# Patient Record
Sex: MALE | Race: WHITE | NOT HISPANIC OR LATINO | Employment: FULL TIME | URBAN - METROPOLITAN AREA
[De-identification: names, ages, dates, MRNs, and addresses within clinical notes are randomized per-mention and may not be internally consistent; named-entity substitution may affect disease eponyms.]

---

## 2020-12-04 ENCOUNTER — LAB VISIT (OUTPATIENT)
Dept: PRIMARY CARE CLINIC | Facility: OTHER | Age: 55
End: 2020-12-04
Attending: INTERNAL MEDICINE
Payer: COMMERCIAL

## 2020-12-04 DIAGNOSIS — R51.9 HEAD ACHE: ICD-10-CM

## 2020-12-04 DIAGNOSIS — Z03.818 ENCOUNTER FOR OBSERVATION FOR SUSPECTED EXPOSURE TO OTHER BIOLOGICAL AGENTS RULED OUT: ICD-10-CM

## 2020-12-04 PROCEDURE — U0003 INFECTIOUS AGENT DETECTION BY NUCLEIC ACID (DNA OR RNA); SEVERE ACUTE RESPIRATORY SYNDROME CORONAVIRUS 2 (SARS-COV-2) (CORONAVIRUS DISEASE [COVID-19]), AMPLIFIED PROBE TECHNIQUE, MAKING USE OF HIGH THROUGHPUT TECHNOLOGIES AS DESCRIBED BY CMS-2020-01-R: HCPCS

## 2020-12-07 LAB — SARS-COV-2 RNA RESP QL NAA+PROBE: NOT DETECTED

## 2021-02-03 ENCOUNTER — TELEPHONE (OUTPATIENT)
Dept: CARDIOLOGY | Facility: CLINIC | Age: 56
End: 2021-02-03

## 2021-02-26 ENCOUNTER — OFFICE VISIT (OUTPATIENT)
Dept: CARDIOLOGY | Facility: CLINIC | Age: 56
End: 2021-02-26
Payer: COMMERCIAL

## 2021-02-26 VITALS
BODY MASS INDEX: 32.51 KG/M2 | HEART RATE: 92 BPM | SYSTOLIC BLOOD PRESSURE: 137 MMHG | HEIGHT: 70 IN | WEIGHT: 227.06 LBS | DIASTOLIC BLOOD PRESSURE: 83 MMHG

## 2021-02-26 DIAGNOSIS — R06.09 DOE (DYSPNEA ON EXERTION): ICD-10-CM

## 2021-02-26 DIAGNOSIS — M25.562 CHRONIC PAIN OF BOTH KNEES: ICD-10-CM

## 2021-02-26 DIAGNOSIS — I10 ESSENTIAL HYPERTENSION: ICD-10-CM

## 2021-02-26 DIAGNOSIS — G89.29 CHRONIC PAIN OF BOTH KNEES: ICD-10-CM

## 2021-02-26 DIAGNOSIS — E78.00 HYPERCHOLESTEROLEMIA: ICD-10-CM

## 2021-02-26 DIAGNOSIS — R07.2 PRECORDIAL PAIN: Primary | ICD-10-CM

## 2021-02-26 DIAGNOSIS — E66.9 OBESITY, CLASS I, BMI 30-34.9: ICD-10-CM

## 2021-02-26 DIAGNOSIS — M25.561 CHRONIC PAIN OF BOTH KNEES: ICD-10-CM

## 2021-02-26 DIAGNOSIS — R01.1 UNDIAGNOSED CARDIAC MURMURS: ICD-10-CM

## 2021-02-26 PROBLEM — E66.811 OBESITY, CLASS I, BMI 30-34.9: Status: ACTIVE | Noted: 2021-02-26

## 2021-02-26 PROCEDURE — 99999 PR PBB SHADOW E&M-EST. PATIENT-LVL III: ICD-10-PCS | Mod: PBBFAC,,, | Performed by: INTERNAL MEDICINE

## 2021-02-26 PROCEDURE — 3008F BODY MASS INDEX DOCD: CPT | Mod: CPTII,S$GLB,, | Performed by: INTERNAL MEDICINE

## 2021-02-26 PROCEDURE — 3008F PR BODY MASS INDEX (BMI) DOCUMENTED: ICD-10-PCS | Mod: CPTII,S$GLB,, | Performed by: INTERNAL MEDICINE

## 2021-02-26 PROCEDURE — 99204 OFFICE O/P NEW MOD 45 MIN: CPT | Mod: S$GLB,,, | Performed by: INTERNAL MEDICINE

## 2021-02-26 PROCEDURE — 99999 PR PBB SHADOW E&M-EST. PATIENT-LVL III: CPT | Mod: PBBFAC,,, | Performed by: INTERNAL MEDICINE

## 2021-02-26 PROCEDURE — 3079F DIAST BP 80-89 MM HG: CPT | Mod: CPTII,S$GLB,, | Performed by: INTERNAL MEDICINE

## 2021-02-26 PROCEDURE — 99204 PR OFFICE/OUTPT VISIT, NEW, LEVL IV, 45-59 MIN: ICD-10-PCS | Mod: S$GLB,,, | Performed by: INTERNAL MEDICINE

## 2021-02-26 PROCEDURE — 3075F PR MOST RECENT SYSTOLIC BLOOD PRESS GE 130-139MM HG: ICD-10-PCS | Mod: CPTII,S$GLB,, | Performed by: INTERNAL MEDICINE

## 2021-02-26 PROCEDURE — 3075F SYST BP GE 130 - 139MM HG: CPT | Mod: CPTII,S$GLB,, | Performed by: INTERNAL MEDICINE

## 2021-02-26 PROCEDURE — 1126F AMNT PAIN NOTED NONE PRSNT: CPT | Mod: S$GLB,,, | Performed by: INTERNAL MEDICINE

## 2021-02-26 PROCEDURE — 3079F PR MOST RECENT DIASTOLIC BLOOD PRESSURE 80-89 MM HG: ICD-10-PCS | Mod: CPTII,S$GLB,, | Performed by: INTERNAL MEDICINE

## 2021-02-26 PROCEDURE — 1126F PR PAIN SEVERITY QUANTIFIED, NO PAIN PRESENT: ICD-10-PCS | Mod: S$GLB,,, | Performed by: INTERNAL MEDICINE

## 2021-02-26 RX ORDER — ASPIRIN 325 MG
325 TABLET, DELAYED RELEASE (ENTERIC COATED) ORAL DAILY
COMMUNITY
End: 2021-12-02

## 2021-02-26 RX ORDER — NITROGLYCERIN 0.4 MG/1
0.4 TABLET SUBLINGUAL EVERY 5 MIN PRN
Qty: 25 TABLET | Refills: 0 | Status: SHIPPED | OUTPATIENT
Start: 2021-02-26 | End: 2021-05-10 | Stop reason: SDUPTHER

## 2021-02-26 RX ORDER — NAPROXEN SODIUM 220 MG
220 TABLET ORAL
COMMUNITY
End: 2021-02-26

## 2021-03-15 ENCOUNTER — PATIENT MESSAGE (OUTPATIENT)
Dept: CARDIOLOGY | Facility: CLINIC | Age: 56
End: 2021-03-15

## 2021-03-16 ENCOUNTER — TELEPHONE (OUTPATIENT)
Dept: CARDIOLOGY | Facility: CLINIC | Age: 56
End: 2021-03-16

## 2021-03-16 DIAGNOSIS — R07.2 PRECORDIAL PAIN: Primary | ICD-10-CM

## 2021-03-18 ENCOUNTER — CLINICAL SUPPORT (OUTPATIENT)
Dept: CARDIOLOGY | Facility: CLINIC | Age: 56
End: 2021-03-18
Attending: INTERNAL MEDICINE
Payer: COMMERCIAL

## 2021-03-18 DIAGNOSIS — R07.2 PRECORDIAL PAIN: ICD-10-CM

## 2021-03-18 PROCEDURE — 93224 XTRNL ECG REC UP TO 48 HRS: CPT | Mod: S$GLB,,, | Performed by: INTERNAL MEDICINE

## 2021-03-18 PROCEDURE — 93224 HOLTER MONITOR - 24 HOUR (CUPID ONLY): ICD-10-PCS | Mod: S$GLB,,, | Performed by: INTERNAL MEDICINE

## 2021-03-22 LAB
OHS CV EVENT MONITOR DAY: 0
OHS CV HOLTER LENGTH DECIMAL HOURS: 24
OHS CV HOLTER LENGTH HOURS: 24
OHS CV HOLTER LENGTH MINUTES: 0

## 2021-03-24 ENCOUNTER — HOSPITAL ENCOUNTER (OUTPATIENT)
Dept: RADIOLOGY | Facility: HOSPITAL | Age: 56
Discharge: HOME | End: 2021-03-24
Attending: INTERNAL MEDICINE
Payer: COMMERCIAL

## 2021-03-24 ENCOUNTER — CLINICAL SUPPORT (OUTPATIENT)
Dept: CARDIOLOGY | Facility: CLINIC | Age: 56
End: 2021-03-24
Attending: INTERNAL MEDICINE
Payer: COMMERCIAL

## 2021-03-24 VITALS — WEIGHT: 227 LBS | BODY MASS INDEX: 32.5 KG/M2 | HEIGHT: 70 IN

## 2021-03-24 DIAGNOSIS — M25.561 CHRONIC PAIN OF BOTH KNEES: ICD-10-CM

## 2021-03-24 DIAGNOSIS — G89.29 CHRONIC PAIN OF BOTH KNEES: ICD-10-CM

## 2021-03-24 DIAGNOSIS — R06.09 DOE (DYSPNEA ON EXERTION): ICD-10-CM

## 2021-03-24 DIAGNOSIS — R07.2 PRECORDIAL PAIN: ICD-10-CM

## 2021-03-24 DIAGNOSIS — M25.562 CHRONIC PAIN OF BOTH KNEES: ICD-10-CM

## 2021-03-24 LAB
ASCENDING AORTA: 3.77 CM
AV INDEX (PROSTH): 0.54
AV MEAN GRADIENT: 4 MMHG
AV PEAK GRADIENT: 7 MMHG
AV VALVE AREA: 2.19 CM2
AV VELOCITY RATIO: 0.78
BSA FOR ECHO PROCEDURE: 2.25 M2
CV ECHO LV RWT: 0.38 CM
CV STRESS BASE HR: 72 BPM
DIASTOLIC BLOOD PRESSURE: 75 MMHG
DOP CALC AO PEAK VEL: 1.35 M/S
DOP CALC AO VTI: 30.31 CM
DOP CALC LVOT AREA: 4 CM2
DOP CALC LVOT DIAMETER: 2.27 CM
DOP CALC LVOT PEAK VEL: 1.05 M/S
DOP CALC LVOT STROKE VOLUME: 66.3 CM3
DOP CALCLVOT PEAK VEL VTI: 16.39 CM
E WAVE DECELERATION TIME: 210.27 MSEC
E/A RATIO: 1.21
E/E' RATIO: 10.67 M/S
ECHO LV POSTERIOR WALL: 0.96 CM (ref 0.6–1.1)
FRACTIONAL SHORTENING: 45 % (ref 28–44)
INTERVENTRICULAR SEPTUM: 0.99 CM (ref 0.6–1.1)
IVRT: 108.47 MSEC
LA MAJOR: 4.46 CM
LA MINOR: 4.6 CM
LA WIDTH: 3.44 CM
LEFT ATRIUM SIZE: 3.87 CM
LEFT ATRIUM VOLUME INDEX: 23.3 ML/M2
LEFT ATRIUM VOLUME: 51.25 CM3
LEFT INTERNAL DIMENSION IN SYSTOLE: 2.82 CM (ref 2.1–4)
LEFT VENTRICLE DIASTOLIC VOLUME INDEX: 56.15 ML/M2
LEFT VENTRICLE DIASTOLIC VOLUME: 123.53 ML
LEFT VENTRICLE MASS INDEX: 83 G/M2
LEFT VENTRICLE SYSTOLIC VOLUME INDEX: 12.5 ML/M2
LEFT VENTRICLE SYSTOLIC VOLUME: 27.6 ML
LEFT VENTRICULAR INTERNAL DIMENSION IN DIASTOLE: 5.1 CM (ref 3.5–6)
LEFT VENTRICULAR MASS: 181.77 G
LV LATERAL E/E' RATIO: 11.43 M/S
LV SEPTAL E/E' RATIO: 10 M/S
MV A" WAVE DURATION": 7.99 MSEC
MV PEAK A VEL: 0.66 M/S
MV PEAK E VEL: 0.8 M/S
NUC REST EJECTION FRACTION: 65
NUC STRESS EJECTION FRACTION: 70 %
OHS CV CPX 1 MINUTE RECOVERY HEART RATE: 104 BPM
OHS CV CPX 85 PERCENT MAX PREDICTED HEART RATE MALE: 138
OHS CV CPX MAX PREDICTED HEART RATE: 162
OHS CV CPX PATIENT IS FEMALE: 0
OHS CV CPX PATIENT IS MALE: 1
OHS CV CPX PEAK DIASTOLIC BLOOD PRESSURE: 63 MMHG
OHS CV CPX PEAK HEAR RATE: 104 BPM
OHS CV CPX PEAK RATE PRESSURE PRODUCT: NORMAL
OHS CV CPX PEAK SYSTOLIC BLOOD PRESSURE: 130 MMHG
OHS CV CPX PERCENT MAX PREDICTED HEART RATE ACHIEVED: 64
OHS CV CPX RATE PRESSURE PRODUCT PRESENTING: 8712
PISA MRMAX VEL: 0.05 M/S
PISA TR MAX VEL: 2.71 M/S
PULM VEIN S/D RATIO: 1.25
PV PEAK D VEL: 0.52 M/S
PV PEAK S VEL: 0.65 M/S
RA MAJOR: 5.41 CM
RA PRESSURE: 3 MMHG
RA WIDTH: 5.1 CM
RIGHT VENTRICULAR END-DIASTOLIC DIMENSION: 3.92 CM
RV TISSUE DOPPLER FREE WALL SYSTOLIC VELOCITY 1 (APICAL 4 CHAMBER VIEW): 16.89 CM/S
SINUS: 3.23 CM
STJ: 3.27 CM
SYSTOLIC BLOOD PRESSURE: 121 MMHG
TDI LATERAL: 0.07 M/S
TDI SEPTAL: 0.08 M/S
TDI: 0.08 M/S
TR MAX PG: 29 MMHG
TRICUSPID ANNULAR PLANE SYSTOLIC EXCURSION: 2.92 CM
TV REST PULMONARY ARTERY PRESSURE: 32 MMHG

## 2021-03-24 PROCEDURE — 93016 STRESS TEST WITH MYOCARDIAL PERFUSION (CUPID ONLY): ICD-10-PCS | Mod: ,,, | Performed by: INTERNAL MEDICINE

## 2021-03-24 PROCEDURE — 93016 CV STRESS TEST SUPVJ ONLY: CPT | Mod: ,,, | Performed by: INTERNAL MEDICINE

## 2021-03-24 PROCEDURE — 99999 PR PBB SHADOW E&M-EST. PATIENT-LVL I: CPT | Mod: PBBFAC,,,

## 2021-03-24 PROCEDURE — 99999 PR PBB SHADOW E&M-EST. PATIENT-LVL I: ICD-10-PCS | Mod: PBBFAC,,,

## 2021-03-24 PROCEDURE — 93018 PR CARDIAC STRESS TST,INTERP/REPT ONLY: ICD-10-PCS | Mod: ,,, | Performed by: INTERNAL MEDICINE

## 2021-03-24 PROCEDURE — 93017 CV STRESS TEST TRACING ONLY: CPT | Mod: PO

## 2021-03-24 PROCEDURE — 78452 HT MUSCLE IMAGE SPECT MULT: CPT | Mod: 26,,, | Performed by: INTERNAL MEDICINE

## 2021-03-24 PROCEDURE — 93018 CV STRESS TEST I&R ONLY: CPT | Mod: ,,, | Performed by: INTERNAL MEDICINE

## 2021-03-24 PROCEDURE — 78452 STRESS TEST WITH MYOCARDIAL PERFUSION (CUPID ONLY): ICD-10-PCS | Mod: 26,,, | Performed by: INTERNAL MEDICINE

## 2021-03-24 RX ORDER — REGADENOSON 0.08 MG/ML
0.4 INJECTION, SOLUTION INTRAVENOUS ONCE
Status: COMPLETED | OUTPATIENT
Start: 2021-03-24 | End: 2021-03-24

## 2021-03-24 RX ADMIN — REGADENOSON 0.4 MG: 0.08 INJECTION, SOLUTION INTRAVENOUS at 09:03

## 2021-04-12 ENCOUNTER — OFFICE VISIT (OUTPATIENT)
Dept: CARDIOLOGY | Facility: CLINIC | Age: 56
End: 2021-04-12
Payer: COMMERCIAL

## 2021-04-12 VITALS
DIASTOLIC BLOOD PRESSURE: 81 MMHG | SYSTOLIC BLOOD PRESSURE: 158 MMHG | HEIGHT: 70 IN | WEIGHT: 228.81 LBS | HEART RATE: 91 BPM | BODY MASS INDEX: 32.76 KG/M2

## 2021-04-12 DIAGNOSIS — I34.0 NONRHEUMATIC MITRAL VALVE REGURGITATION: Primary | Chronic | ICD-10-CM

## 2021-04-12 DIAGNOSIS — E66.9 OBESITY, CLASS I, BMI 30-34.9: ICD-10-CM

## 2021-04-12 DIAGNOSIS — I49.49 PREMATURE BEATS: ICD-10-CM

## 2021-04-12 DIAGNOSIS — E78.00 HYPERCHOLESTEROLEMIA: Chronic | ICD-10-CM

## 2021-04-12 DIAGNOSIS — I77.810 ASCENDING AORTA DILATATION: ICD-10-CM

## 2021-04-12 DIAGNOSIS — I10 ESSENTIAL HYPERTENSION: Chronic | ICD-10-CM

## 2021-04-12 DIAGNOSIS — E78.5 DYSLIPIDEMIA: ICD-10-CM

## 2021-04-12 PROBLEM — R01.1 UNDIAGNOSED CARDIAC MURMURS: Status: RESOLVED | Noted: 2021-02-26 | Resolved: 2021-04-12

## 2021-04-12 PROCEDURE — 3008F PR BODY MASS INDEX (BMI) DOCUMENTED: ICD-10-PCS | Mod: CPTII,S$GLB,, | Performed by: INTERNAL MEDICINE

## 2021-04-12 PROCEDURE — 99214 OFFICE O/P EST MOD 30 MIN: CPT | Mod: S$GLB,,, | Performed by: INTERNAL MEDICINE

## 2021-04-12 PROCEDURE — 1126F AMNT PAIN NOTED NONE PRSNT: CPT | Mod: S$GLB,,, | Performed by: INTERNAL MEDICINE

## 2021-04-12 PROCEDURE — 99999 PR PBB SHADOW E&M-EST. PATIENT-LVL III: CPT | Mod: PBBFAC,,, | Performed by: INTERNAL MEDICINE

## 2021-04-12 PROCEDURE — 1126F PR PAIN SEVERITY QUANTIFIED, NO PAIN PRESENT: ICD-10-PCS | Mod: S$GLB,,, | Performed by: INTERNAL MEDICINE

## 2021-04-12 PROCEDURE — 99999 PR PBB SHADOW E&M-EST. PATIENT-LVL III: ICD-10-PCS | Mod: PBBFAC,,, | Performed by: INTERNAL MEDICINE

## 2021-04-12 PROCEDURE — 3079F PR MOST RECENT DIASTOLIC BLOOD PRESSURE 80-89 MM HG: ICD-10-PCS | Mod: CPTII,S$GLB,, | Performed by: INTERNAL MEDICINE

## 2021-04-12 PROCEDURE — 3077F SYST BP >= 140 MM HG: CPT | Mod: CPTII,S$GLB,, | Performed by: INTERNAL MEDICINE

## 2021-04-12 PROCEDURE — 99214 PR OFFICE/OUTPT VISIT, EST, LEVL IV, 30-39 MIN: ICD-10-PCS | Mod: S$GLB,,, | Performed by: INTERNAL MEDICINE

## 2021-04-12 PROCEDURE — 3077F PR MOST RECENT SYSTOLIC BLOOD PRESSURE >= 140 MM HG: ICD-10-PCS | Mod: CPTII,S$GLB,, | Performed by: INTERNAL MEDICINE

## 2021-04-12 PROCEDURE — 3079F DIAST BP 80-89 MM HG: CPT | Mod: CPTII,S$GLB,, | Performed by: INTERNAL MEDICINE

## 2021-04-12 PROCEDURE — 3008F BODY MASS INDEX DOCD: CPT | Mod: CPTII,S$GLB,, | Performed by: INTERNAL MEDICINE

## 2021-04-12 RX ORDER — AMLODIPINE BESYLATE 5 MG/1
5 TABLET ORAL DAILY
Qty: 90 TABLET | Refills: 1 | Status: SHIPPED | OUTPATIENT
Start: 2021-04-12 | End: 2021-10-14 | Stop reason: SDUPTHER

## 2021-04-12 RX ORDER — CARVEDILOL 3.12 MG/1
3.12 TABLET ORAL 2 TIMES DAILY WITH MEALS
Qty: 180 TABLET | Refills: 1 | Status: SHIPPED | OUTPATIENT
Start: 2021-04-12 | End: 2021-10-10

## 2021-04-12 RX ORDER — ATORVASTATIN CALCIUM 20 MG/1
20 TABLET, FILM COATED ORAL DAILY
Qty: 90 TABLET | Refills: 1 | Status: SHIPPED | OUTPATIENT
Start: 2021-04-12 | End: 2021-07-19 | Stop reason: SDUPTHER

## 2021-05-10 ENCOUNTER — PATIENT MESSAGE (OUTPATIENT)
Dept: CARDIOLOGY | Facility: CLINIC | Age: 56
End: 2021-05-10

## 2021-05-10 DIAGNOSIS — I49.49 PREMATURE BEATS: Primary | ICD-10-CM

## 2021-05-10 RX ORDER — NITROGLYCERIN 0.4 MG/1
0.4 TABLET SUBLINGUAL EVERY 5 MIN PRN
Qty: 25 TABLET | Refills: 0 | Status: SHIPPED | OUTPATIENT
Start: 2021-05-10 | End: 2021-12-02

## 2021-10-14 ENCOUNTER — OFFICE VISIT (OUTPATIENT)
Dept: CARDIOLOGY | Facility: CLINIC | Age: 56
End: 2021-10-14
Payer: COMMERCIAL

## 2021-10-14 VITALS
DIASTOLIC BLOOD PRESSURE: 83 MMHG | HEIGHT: 70 IN | SYSTOLIC BLOOD PRESSURE: 131 MMHG | WEIGHT: 217.81 LBS | BODY MASS INDEX: 31.18 KG/M2 | HEART RATE: 70 BPM

## 2021-10-14 DIAGNOSIS — I10 ESSENTIAL HYPERTENSION: Chronic | ICD-10-CM

## 2021-10-14 DIAGNOSIS — I34.0 NONRHEUMATIC MITRAL VALVE REGURGITATION: Primary | Chronic | ICD-10-CM

## 2021-10-14 DIAGNOSIS — I49.49 PREMATURE BEATS: Chronic | ICD-10-CM

## 2021-10-14 DIAGNOSIS — E66.9 OBESITY, CLASS I, BMI 30-34.9: Chronic | ICD-10-CM

## 2021-10-14 DIAGNOSIS — E78.5 DYSLIPIDEMIA: Chronic | ICD-10-CM

## 2021-10-14 DIAGNOSIS — I77.810 ASCENDING AORTA DILATATION: Chronic | ICD-10-CM

## 2021-10-14 PROCEDURE — 3075F SYST BP GE 130 - 139MM HG: CPT | Mod: CPTII,S$GLB,, | Performed by: INTERNAL MEDICINE

## 2021-10-14 PROCEDURE — 1159F PR MEDICATION LIST DOCUMENTED IN MEDICAL RECORD: ICD-10-PCS | Mod: CPTII,S$GLB,, | Performed by: INTERNAL MEDICINE

## 2021-10-14 PROCEDURE — 3008F BODY MASS INDEX DOCD: CPT | Mod: CPTII,S$GLB,, | Performed by: INTERNAL MEDICINE

## 2021-10-14 PROCEDURE — 1159F MED LIST DOCD IN RCRD: CPT | Mod: CPTII,S$GLB,, | Performed by: INTERNAL MEDICINE

## 2021-10-14 PROCEDURE — 3008F PR BODY MASS INDEX (BMI) DOCUMENTED: ICD-10-PCS | Mod: CPTII,S$GLB,, | Performed by: INTERNAL MEDICINE

## 2021-10-14 PROCEDURE — 99999 PR PBB SHADOW E&M-EST. PATIENT-LVL III: ICD-10-PCS | Mod: PBBFAC,,, | Performed by: INTERNAL MEDICINE

## 2021-10-14 PROCEDURE — 99999 PR PBB SHADOW E&M-EST. PATIENT-LVL III: CPT | Mod: PBBFAC,,, | Performed by: INTERNAL MEDICINE

## 2021-10-14 PROCEDURE — 99214 OFFICE O/P EST MOD 30 MIN: CPT | Mod: S$GLB,,, | Performed by: INTERNAL MEDICINE

## 2021-10-14 PROCEDURE — 99214 PR OFFICE/OUTPT VISIT, EST, LEVL IV, 30-39 MIN: ICD-10-PCS | Mod: S$GLB,,, | Performed by: INTERNAL MEDICINE

## 2021-10-14 PROCEDURE — 3079F PR MOST RECENT DIASTOLIC BLOOD PRESSURE 80-89 MM HG: ICD-10-PCS | Mod: CPTII,S$GLB,, | Performed by: INTERNAL MEDICINE

## 2021-10-14 PROCEDURE — 3075F PR MOST RECENT SYSTOLIC BLOOD PRESS GE 130-139MM HG: ICD-10-PCS | Mod: CPTII,S$GLB,, | Performed by: INTERNAL MEDICINE

## 2021-10-14 PROCEDURE — 3079F DIAST BP 80-89 MM HG: CPT | Mod: CPTII,S$GLB,, | Performed by: INTERNAL MEDICINE

## 2021-10-14 RX ORDER — ATORVASTATIN CALCIUM 20 MG/1
20 TABLET, FILM COATED ORAL DAILY
Qty: 90 TABLET | Refills: 1 | Status: SHIPPED | OUTPATIENT
Start: 2021-10-14 | End: 2022-07-29

## 2021-10-14 RX ORDER — AMLODIPINE BESYLATE 5 MG/1
5 TABLET ORAL DAILY
Qty: 90 TABLET | Refills: 1 | Status: SHIPPED | OUTPATIENT
Start: 2021-10-14 | End: 2022-07-27

## 2021-10-14 RX ORDER — CARVEDILOL 3.12 MG/1
3.12 TABLET ORAL 2 TIMES DAILY WITH MEALS
Qty: 180 TABLET | Refills: 1 | Status: SHIPPED | OUTPATIENT
Start: 2021-10-14 | End: 2022-07-27

## 2022-09-15 ENCOUNTER — TELEPHONE (OUTPATIENT)
Dept: PAIN MEDICINE | Facility: CLINIC | Age: 57
End: 2022-09-15
Payer: OTHER GOVERNMENT

## 2022-09-15 ENCOUNTER — PATIENT MESSAGE (OUTPATIENT)
Dept: PAIN MEDICINE | Facility: CLINIC | Age: 57
End: 2022-09-15
Payer: OTHER GOVERNMENT

## 2022-09-15 NOTE — TELEPHONE ENCOUNTER
Return call received from Pt inquiring about restrictions. Pt is informed that he is only scheduled for an office visit with Dr. Pritchard. Pt is provided fax number [facility]. Pt is aware and will take care of it. [phone].

## 2022-09-15 NOTE — TELEPHONE ENCOUNTER
Call placed and my chart message sent in regards to Pt's activity restrictions after his shot. No appt noted. No naswer. V/m left.

## 2022-09-15 NOTE — TELEPHONE ENCOUNTER
----- Message from Harini Arana sent at 9/14/2022  3:49 PM CDT -----  Contact: pt  Type: Needs Medical Advice    Who Called: pt  Best Call Back Number: [phone]    Inquiry/Question: pt wants to know if there are any restrictions after this shot, can he drive himself.          Thank you~

## 2022-09-20 ENCOUNTER — OFFICE VISIT (OUTPATIENT)
Dept: PAIN MEDICINE | Facility: CLINIC | Age: 57
End: 2022-09-20
Payer: OTHER GOVERNMENT

## 2022-09-20 VITALS
BODY MASS INDEX: 31.22 KG/M2 | HEIGHT: 70 IN | WEIGHT: 218.06 LBS | HEART RATE: 79 BPM | DIASTOLIC BLOOD PRESSURE: 74 MMHG | OXYGEN SATURATION: 97 % | SYSTOLIC BLOOD PRESSURE: 122 MMHG

## 2022-09-20 DIAGNOSIS — M54.16 LUMBAR RADICULOPATHY: Primary | ICD-10-CM

## 2022-09-20 DIAGNOSIS — G89.29 CHRONIC RIGHT-SIDED LOW BACK PAIN WITH RIGHT-SIDED SCIATICA: ICD-10-CM

## 2022-09-20 DIAGNOSIS — M54.41 CHRONIC RIGHT-SIDED LOW BACK PAIN WITH RIGHT-SIDED SCIATICA: ICD-10-CM

## 2022-09-20 PROCEDURE — 99215 OFFICE O/P EST HI 40 MIN: CPT | Mod: PBBFAC,PN | Performed by: ANESTHESIOLOGY

## 2022-09-20 PROCEDURE — 99204 OFFICE O/P NEW MOD 45 MIN: CPT | Mod: S$PBB,,, | Performed by: ANESTHESIOLOGY

## 2022-09-20 PROCEDURE — 99999 PR PBB SHADOW E&M-EST. PATIENT-LVL V: ICD-10-PCS | Mod: PBBFAC,,, | Performed by: ANESTHESIOLOGY

## 2022-09-20 PROCEDURE — 99999 PR PBB SHADOW E&M-EST. PATIENT-LVL V: CPT | Mod: PBBFAC,,, | Performed by: ANESTHESIOLOGY

## 2022-09-20 PROCEDURE — 99204 PR OFFICE/OUTPT VISIT, NEW, LEVL IV, 45-59 MIN: ICD-10-PCS | Mod: S$PBB,,, | Performed by: ANESTHESIOLOGY

## 2022-09-20 RX ORDER — SODIUM CHLORIDE, SODIUM LACTATE, POTASSIUM CHLORIDE, CALCIUM CHLORIDE 600; 310; 30; 20 MG/100ML; MG/100ML; MG/100ML; MG/100ML
INJECTION, SOLUTION INTRAVENOUS CONTINUOUS
Status: CANCELLED | OUTPATIENT
Start: 2022-09-20

## 2022-09-20 NOTE — PROGRESS NOTES
[facility] New Patient Evaluation      CC:   Chief Complaint   Patient presents with    Back Pain      No flowsheet data found.    HPI:   Bob Farmer is a 57 y.o. male who presents with back pain.  He reports that his back pain started on July 22, 2022.  He denies any specific trauma or inciting event.  He reports he woke up 1 morning with severe lower back pain radiating down the right leg.  Today he reports his pain can range from 03/10/2010, intermittent, aching, burning, tingling, sharp, shooting radiating pain down the right leg to the right foot.  He reports numbness and weakness in the right leg.  His pain is worse with lying, bending and relieved with sitting.    History:    Current Outpatient Medications:     amLODIPine (NORVASC) 5 MG tablet, Take 1 tablet by mouth once daily, Disp: 90 tablet, Rfl: 0    atorvastatin (LIPITOR) 20 MG tablet, Take 1 tablet by mouth once daily, Disp: 90 tablet, Rfl: 0    carvediloL (COREG) 3.125 MG tablet, TAKE 1 TABLET BY MOUTH TWICE DAILY WITH MEALS, Disp: 180 tablet, Rfl: 0    FLUoxetine 40 MG capsule, Take 1 capsule by mouth once daily, Disp: 90 capsule, Rfl: 0    Past Medical History:   Diagnosis Date    Allergy     Hyperlipemia     Hypertension        Past Surgical History:   Procedure Laterality Date    COLONOSCOPY  02/22/2019    Dr Lofton, received report     HERNIA REPAIR         Family History   Problem Relation Age of Onset    Hypertension Mother     Diabetes Father        Social History     Socioeconomic History    Marital status:    Tobacco Use    Smoking status: Never    Smokeless tobacco: Never   Substance and Sexual Activity    Alcohol use: Yes     Alcohol/week: 0.0 standard drinks     Comment: occasional    Drug use: No    Sexual activity: Yes     Partners: Female     Social Determinants of Health     Financial Resource Strain: Low Risk     Difficulty of Paying Living Expenses: Not hard at all   Food Insecurity: No Food Insecurity     "Worried About Running Out of Food in the Last Year: Never true    Ran Out of Food in the Last Year: Never true   Transportation Needs: No Transportation Needs    Lack of Transportation (Medical): No    Lack of Transportation (Non-Medical): No   Physical Activity: Inactive    Days of Exercise per Week: 0 days    Minutes of Exercise per Session: 0 min   Stress: No Stress Concern Present    Feeling of Stress : Only a little   Social Connections: Unknown    Frequency of Communication with Friends and Family: Once a week    Frequency of Social Gatherings with Friends and Family: Never    Active Member of Clubs or Organizations: No    Attends Club or Organization Meetings: Never    Marital Status:    Housing Stability: Low Risk     Unable to Pay for Housing in the Last Year: No    Number of Places Lived in the Last Year: 1    Unstable Housing in the Last Year: No       Review of patient's allergies indicates:  No Known Allergies    Review of Systems:  General ROS: negative for - fever  Psychological ROS: negative for - hostility  Hematological and Lymphatic ROS: negative for - bleeding problems  Endocrine ROS: negative for - unexpected weight changes  Respiratory ROS: no cough, shortness of breath, or wheezing  Cardiovascular ROS: no chest pain or dyspnea on exertion  Gastrointestinal ROS: no abdominal pain, change in bowel habits, or black or bloody stools  Musculoskeletal ROS: positive for - muscular weakness  Neurological ROS: positive for - numbness/tingling  Dermatological ROS: negative for rash    Physical Exam:  Vitals:    09/20/22 0903   BP: 122/74   Pulse: 79   SpO2: 97%   Weight: 98.9 kg (218 lb 0.6 oz)   Height: 5' 10" (1.778 m)   PainSc:   6   PainLoc: Back     Body mass index is 31.28 kg/m².      Gen: NAD  Gait: gait intact  Psych:  Mood appropriate for given condition  HEENT: eyes anicteric   GI: Abd soft  CV: RRR  Lungs: breathing unlabored   ROM: limited AROM of the L spine in all planes, full ROM " at ankles, knees and hips  Sensation: intact to light touch in all dermatomes tested from L2-S1 bilaterally, except decreased over the right L5  Reflexes: 2+ b/l patella and 0 b/l achilles  Palpation: Diffusely tender over lumbar paraspinals  -TTP over the b/l greater trochanters and bilateral SI joint  Tone: normal in the b/l knees and hips   Skin: intact  Extremities: No edema in b/l ankles or hands  Provacative tests:        Right Left   L2/3 Iliacus Hip flexion  5  5   L3/4 Qudratus Femoris Knee Extension  5  5   L4/5 Tib Anterior Ankle Dorsiflexion   5  5   L5/S1 Extensor Hallicus Longus Great toe extension  2+ to 3-  5                 S1/S2 Gastroc/Soleus Plantar Flexion  5  5       Imaging:  he does not have his lumbar MRI with him however he [facility] and it appears that he has an L4-5 right paracentral disc extrusion causing compression of the right L5 nerve root    Labs:  BMP  Lab Results   Component Value Date     02/02/2021    K 4.8 02/02/2021     02/02/2021    CO2 26 02/02/2021    BUN 21 02/02/2021    CREATININE 0.88 02/02/2021    CALCIUM 9.7 02/02/2021    ANIONGAP 12 02/02/2021    ESTGFRAFRICA >60 02/02/2021    EGFRNONAA >60 02/02/2021     Lab Results   Component Value Date    ALT 23 02/02/2021    AST 50 02/02/2021    ALKPHOS 119 02/02/2021    BILITOT 1.6 (H) 02/02/2021       Assessment:   Problem List Items Addressed This Visit    None  Visit Diagnoses       Lumbar radiculopathy    -  Primary    Relevant Orders    Ambulatory referral/consult to Physical/Occupational Therapy    Case Request Operating Room: Injection-steroid-epidural-lumbar L5/S1 to right (Completed)    Chronic right-sided low back pain with right-sided sciatica                57 y.o. year old male with PMH HTN who presents with back pain.  He reports that his back pain started on July 22, 2022.  He denies any specific trauma or inciting event.  He reports he woke up 1 morning with severe lower back  pain radiating down the right leg.  Today he reports his pain can range from 03/10/2010, intermittent, aching, burning, tingling, sharp, shooting radiating pain down the right leg to the right foot.  He reports numbness and weakness in the right leg.  His pain is worse with lying, bending and relieved with sitting.    - on exam he has 2+to 3-/5 right EHL.  He has decreased sensation to light touch in the right L5 distribution  - he does not have his lumbar MRI with him however he [facility] and [facility] appears that he has an L4-5 right paracentral disc extrusion causing compression of the right L5 nerve root  - he reports [facility] who [facility] however he did not wish to proceed with surgical interventions at this time.  I discussed that I also recommended a repeat surgical evaluation given his persistent weakness however he is not interested in seeing a surgeon again right now and would really like to maximize all conservative treatment options prior to considering surgery  - his pain is limiting his mobility and interfering with his ADLs  - we will schedule for a lumbar FAIZAN followed by formal physical therapy.  I have placed a referral for the formal physical therapy.  - schedule for L5/S1 FAIZAN to the right. The risks and benefits of this intervention, and alternative therapies were discussed with the patient.  The discussion of risks included infection, bleeding, need for additional procedures or surgery, nerve damage.  Questions regarding the procedure, risks, expected outcome, and possible side effects were solicited and answered to the patient's satisfaction.  Oneil Farmer wishes to proceed with the injection or procedure.  Written consent was obtained.  - follow-up 1 week post injection.      : Not applicable    Benny Pritchard M.D.  Interventional Pain Medicine / Anesthesiology    This note was completed with dictation software and grammatical errors may  exist.

## 2022-09-22 ENCOUNTER — CLINICAL SUPPORT (OUTPATIENT)
Dept: REHABILITATION | Facility: HOSPITAL | Age: 57
End: 2022-09-22
Attending: ANESTHESIOLOGY
Payer: OTHER GOVERNMENT

## 2022-09-22 DIAGNOSIS — M54.16 LUMBAR RADICULOPATHY: ICD-10-CM

## 2022-09-22 DIAGNOSIS — G89.29 CHRONIC RIGHT-SIDED LOW BACK PAIN WITH RIGHT-SIDED SCIATICA: Primary | ICD-10-CM

## 2022-09-22 DIAGNOSIS — M54.41 CHRONIC RIGHT-SIDED LOW BACK PAIN WITH RIGHT-SIDED SCIATICA: Primary | ICD-10-CM

## 2022-09-22 DIAGNOSIS — M62.81 MUSCLE WEAKNESS: ICD-10-CM

## 2022-09-22 DIAGNOSIS — R26.2 DIFFICULTY WALKING: ICD-10-CM

## 2022-09-22 PROCEDURE — 97110 THERAPEUTIC EXERCISES: CPT | Mod: PN | Performed by: PHYSICAL THERAPIST

## 2022-09-22 PROCEDURE — 97161 PT EVAL LOW COMPLEX 20 MIN: CPT | Mod: PN | Performed by: PHYSICAL THERAPIST

## 2022-09-23 RX ORDER — MELOXICAM 15 MG/1
15 TABLET ORAL 2 TIMES DAILY
COMMUNITY
End: 2022-11-03

## 2022-09-23 NOTE — PLAN OF CARE
[facility]  Physical Therapy Initial Evaluation    Name: Bob Farmer  Clinic Number: [id number]    Therapy Diagnosis:   Encounter Diagnoses   Name Primary?    Lumbar radiculopathy     Chronic right-sided low back pain with right-sided sciatica Yes    Muscle weakness     Difficulty walking      Physician: Benny Pritchard MD    Physician Orders: PT Eval and Treat   Medical Diagnosis from Referral: M54.16 (ICD-10-CM) - Lumbar radiculopathy  Evaluation Date: 9/22/2022  Authorization Period Expiration: 3-14-23  Plan of Care Expiration: 11-17-22  Progress Note Due: 10-22-22  Visit # / Visits authorized: 1/ 1  FOTO: Issued Visit #: 1    MD Follow up appointment: 10-5-22    Precautions: Standard weakness of EHL    Time In: 5:38  Time Out: 6:20  Total Billable Time: 42 minutes    Subjective   Date of onset: beginning of July  History of current condition - Don reports: no known injury, woke up and felt pinch in R hip and day progressed on got worse.  Pt had difficulty walking R foot went numb and lasted 1 month. Pt went to ER and had injection and felt better.  [facility]  Pt states then referred to Dr. Pritchard and he recommended PT and injections.  Pt states no longer numb but difficulty with lifting toes  Pt denies prior hx of LBP     Medical History:   Past Medical History:   Diagnosis Date    Allergy     Hyperlipemia     Hypertension        Surgical History:   Bob Farmer  has a past surgical history that includes Hernia repair and Colonoscopy (02/22/2019).    Medications:   Bob has a current medication list which includes the following prescription(s): amlodipine, atorvastatin, carvedilol, and fluoxetine.    Allergies:   Review of patient's allergies indicates:  No Known Allergies     Imaging, MRI studies: as per Dr Pritchard note on 9-20-22 he does not have his lumbar MRI with him however he [facility] and [facility] appears that he has  an L4-5 right paracentral disc extrusion causing compression of the right L5 nerve root    Prior Therapy: none  Social History: lives with wife has stairs at work to climb and able to work in yard and activities around house  Occupation: construction  Prior Level of Function: no limitations  Current Level of Function: limited with walking, climbing stairs, walking at work wears steel toed shoes, disturbed sleep with some cramping in feet at night Pain upon awakening in LB limited sit to stand transfer  Exercise for Wellness: none    Pain:  Current 2/10, worst 10/10, best 2/10   Location: right LB and down R LE to toes  Description: Tight and Sharp  Aggravating Factors: Standing, Walking, Flexing, and Lifting  Easing Factors: medication  Sleep is  disturbed. Sleeping position: mostly stomach  Saddle symptoms: neg  Bowel or bladder:  neg    Pts goals: get back to normal    Objective     Postural examination in standing:  - normal lumbar lordosis  - forward head  - forward shoulders  - R hip high  - L shoulder high    Postural examination in sitting:   - normal lumbar lordosis  - forward head  - forward shoulders      Functional assessment:   - walking: antalgic R slight decreased DF with heel strike R as compared to L   - sit to stand: min difficulty  - sit to supine: no difficulty       - supine to sit: no difficulty  - supine to prone: no difficulty    Lumbar active range of motion in standing is:  - flexion - mid calf                      - extension -  10%                         - left side bending -  mid thigh  decreased C curve       - right side bending -  mid thigh decreased C curve            Pelvic positioning: level     Flexibility testing:  - hamstrings:     90/90 test R 35 feels tight L 25           - gastrocnemius:   DF ankle R 5 degrees L 10 degrees  - piriformis:                  - quadriceps:                 - hip flexors:  - hip adductors:  - IT Bands:     Muscle Strength  MMT R L   Hip flexion 3/5  c/o pain with resistance 5/5   Hip abduction Limited by pain 5/5   Hip extension Limited by pain 5/5   Knee extension 5/5 5/5   Knee flexion 5-/5 5/5   Ankle dorsiflexion 5/5 5/5   EHL 3-/5 5/5       Endurance is not tested    Lumbar Special tests:  SLR neg    Palpation: mod TTP R glut/piriformis and R lumbar paraspinals    Joint mobility: mod decreased lumbar spring    Sensation: Impaired: 1st and 2nd toe R  Reflexes: decreased knee reflex R +1 L , unable to get pt to relax to assess ankle reflex B      CMS Impairment/Limitation/Restriction for FOTO lumbar Survey    Therapist reviewed FOTO scores for Bob Farmer on 9/22/2022.   FOTO documents entered into [employer] - see Media section.    Limitation Score: 59%       TREATMENT   Treatment Time In: 6:10  Treatment Time Out: 6:20  Total Treatment time separate from Evaluation: 10 minutes    Oneil received therapeutic exercises to develop flexibility, core stabilization, and Zina ext principles for 10 minutes including:  Instructed pt in spine and pelvic girdle anatomy and biomechanics and effect of flexion and extension principles.    Prone press up x 10 q 2 hours  GSS standing x 10 .      Home Exercises and Patient Education Provided    Education provided:   - flexion/extension principles  - HEP   - need to avoid radicular symptoms    Written Home Exercises Provided: Yes   Exercises were reviewed and Oneil was able to demonstrate them prior to the end of the session.  Oneil demonstrated good understanding of the education provided.     See EMR under Patient Instructions for exercises provided 9/22/2022.    Assessment   Bob is a 57 y.o. male referred to outpatient Physical Therapy with a medical diagnosis of M54.16 (ICD-10-CM) - Lumbar radiculopathy. Pt presents with LBP with R radicular symptoms with weakness of toe extensors sly EHL.  Pt appears to respond to extension principles     Pt prognosis is Good.   Pt will benefit from skilled outpatient Physical Therapy to  address the deficits stated above and in the chart below, provide pt/family education, and to maximize pt's level of independence.     Plan of care discussed with patient: Yes  Pt's spiritual, cultural and educational needs considered and patient is agreeable to the plan of care and goals as stated below:     Anticipated Barriers for therapy: work schedule    Medical Necessity is demonstrated by the following  History  Co-morbidities and personal factors that may impact the plan of care Co-morbidities:   none    Personal Factors:   no deficits     low   Examination  Body Structures and Functions, activity limitations and participation restrictions that may impact the plan of care Body Regions:   back  lower extremities  trunk    Body Systems:    ROM  strength  gait  transfers    Participation Restrictions:   Work and ADL    Activity limitations:   Learning and applying knowledge  no deficits    General Tasks and Commands  no deficits    Communication  no deficits    Mobility  walking    Self care  no deficits    Domestic Life  shopping  cooking  doing house work (cleaning house, washing dishes, laundry)  assisting others    Interactions/Relationships  no deficits    Life Areas  no deficits    Community and Social Life  no deficits         moderate   Clinical Presentation stable and uncomplicated low   Decision Making/ Complexity Score: low     GOALS:   Short Term Goals:  4 weeks  Increase range of motion 25%  Increase strength 1/2 muscle grade  Centralize radicular symptoms R LE  Be able to perform HEP with minimal cueing required    Long Term Goals: 8 weeks  Increase range of motion to 75% to 100% full   Improve muscle strength 1 muscle grade  Restore ability to ambulate with normal pain free gait  Walking for ADL and work will be restored without increased pain  Stair climbing in a reciprocal manner will be restored without increased pain   Restore ability to stand for ADL and work without increased pain  Restore  ability to perform sit to stand transfer without increased pain  Restore normal sleep habits without disturbances due to pain  Restore ability to perform ADL's and household activities independently and without increased pain    Plan   Plan of care Certification: 9/22/2022 to 11-17-22.    Outpatient Physical Therapy 2 times weekly for 8 weeks to include the following interventions: Therapeutic exercises, manual therapy, neuromuscular re-education, therapeutic activities, modalities such as moist heat, ice, ultrasound and electrical stimulation, lumbar mechanical traction and dry needling will be considered and utilized as needed    Carmela Reynolds, PT    I certify the need for these services furnished under this plan of treatment and while under my care.    ____________________________________ Physician/Referring Practitioner                                  Date of Signature

## 2022-09-27 ENCOUNTER — PATIENT MESSAGE (OUTPATIENT)
Dept: SURGERY | Facility: HOSPITAL | Age: 57
End: 2022-09-27
Payer: OTHER GOVERNMENT

## 2022-09-27 ENCOUNTER — TELEPHONE (OUTPATIENT)
Dept: PAIN MEDICINE | Facility: CLINIC | Age: 57
End: 2022-09-27
Payer: OTHER GOVERNMENT

## 2022-09-27 NOTE — TELEPHONE ENCOUNTER
----- Message from María Elena Avila sent at 9/27/2022 12:09 PM CDT -----  Contact: Pt  Type: Needs Medical Advice    Who Called:  Pt    Best Call Back Number: [phone]    Additional Information: Pt states he won't be able to go to PT until office has authorization code from [facility].      Please call back.  Thanks.

## 2022-09-28 ENCOUNTER — HOSPITAL ENCOUNTER (OUTPATIENT)
Dept: RADIOLOGY | Facility: HOSPITAL | Age: 57
Discharge: HOME | End: 2022-09-28
Attending: ANESTHESIOLOGY
Payer: OTHER GOVERNMENT

## 2022-09-28 ENCOUNTER — HOSPITAL ENCOUNTER (OUTPATIENT)
Facility: HOSPITAL | Age: 57
Discharge: HOME | End: 2022-09-28
Attending: ANESTHESIOLOGY | Admitting: ANESTHESIOLOGY
Payer: OTHER GOVERNMENT

## 2022-09-28 DIAGNOSIS — M54.16 LUMBAR RADICULOPATHY: Primary | ICD-10-CM

## 2022-09-28 DIAGNOSIS — M54.50 LOWER BACK PAIN: ICD-10-CM

## 2022-09-28 PROCEDURE — 63600175 PHARM REV CODE 636 W HCPCS: Mod: PO | Performed by: ANESTHESIOLOGY

## 2022-09-28 PROCEDURE — A4216 STERILE WATER/SALINE, 10 ML: HCPCS | Mod: PO | Performed by: ANESTHESIOLOGY

## 2022-09-28 PROCEDURE — 62323 NJX INTERLAMINAR LMBR/SAC: CPT | Mod: ,,, | Performed by: ANESTHESIOLOGY

## 2022-09-28 PROCEDURE — 25500020 PHARM REV CODE 255: Mod: PO | Performed by: ANESTHESIOLOGY

## 2022-09-28 PROCEDURE — 25000003 PHARM REV CODE 250: Mod: PO | Performed by: ANESTHESIOLOGY

## 2022-09-28 PROCEDURE — 76000 FLUOROSCOPY <1 HR PHYS/QHP: CPT | Mod: TC,PO

## 2022-09-28 PROCEDURE — 62323 PR INJ LUMBAR/SACRAL, W/IMAGING GUIDANCE: ICD-10-PCS | Mod: ,,, | Performed by: ANESTHESIOLOGY

## 2022-09-28 PROCEDURE — 62323 NJX INTERLAMINAR LMBR/SAC: CPT | Mod: PO | Performed by: ANESTHESIOLOGY

## 2022-09-28 RX ORDER — SODIUM CHLORIDE, SODIUM LACTATE, POTASSIUM CHLORIDE, CALCIUM CHLORIDE 600; 310; 30; 20 MG/100ML; MG/100ML; MG/100ML; MG/100ML
INJECTION, SOLUTION INTRAVENOUS CONTINUOUS
Status: DISCONTINUED | OUTPATIENT
Start: 2022-09-28 | End: 2022-09-28

## 2022-09-28 RX ORDER — LIDOCAINE HYDROCHLORIDE 10 MG/ML
INJECTION, SOLUTION EPIDURAL; INFILTRATION; INTRACAUDAL; PERINEURAL
Status: DISCONTINUED | OUTPATIENT
Start: 2022-09-28 | End: 2022-09-28 | Stop reason: HOSPADM

## 2022-09-28 RX ORDER — SODIUM CHLORIDE 9 MG/ML
INJECTION, SOLUTION INTRAMUSCULAR; INTRAVENOUS; SUBCUTANEOUS
Status: DISCONTINUED | OUTPATIENT
Start: 2022-09-28 | End: 2022-09-28 | Stop reason: HOSPADM

## 2022-09-28 RX ORDER — METHYLPREDNISOLONE ACETATE 80 MG/ML
INJECTION, SUSPENSION INTRA-ARTICULAR; INTRALESIONAL; INTRAMUSCULAR; SOFT TISSUE
Status: DISCONTINUED | OUTPATIENT
Start: 2022-09-28 | End: 2022-09-28 | Stop reason: HOSPADM

## 2022-09-28 NOTE — DISCHARGE SUMMARY
[facility]  Discharge Note  Short Stay    Admit Date: 9/28/2022    Discharge Date: 9/28/2022    Attending Physician: Benny Pritchard     Discharge Provider: Benny Pritchard    Diagnoses:  There are no hospital problems to display for this patient.      Discharged Condition: Good    Final Diagnoses: Lumbar radiculopathy [M54.16]    Disposition: Home or Self Care    Hospital Course: No complications, uneventful    Outcome of Hospitalization, Treatment, Procedure, or Surgery:  Patient was admitted for outpatient interventional pain management procedure. The patient tolerated the procedure well with no complications.    Follow up/Patient Instructions:  Follow up as scheduled in Pain Management office in 2-3 weeks.  Patient has received instructions and follow up date and time.    Medications:  Continue previous medications    Discharge Procedure Orders   Notify your health care provider if you experience any of the following:  temperature >100.4     Notify your health care provider if you experience any of the following:  persistent nausea and vomiting or diarrhea     Notify your health care provider if you experience any of the following:  severe uncontrolled pain     Notify your health care provider if you experience any of the following:  redness, tenderness, or signs of infection (pain, swelling, redness, odor or green/yellow discharge around incision site)     Notify your health care provider if you experience any of the following:  difficulty breathing or increased cough     Notify your health care provider if you experience any of the following:  severe persistent headache     Notify your health care provider if you experience any of the following:  worsening rash     Notify your health care provider if you experience any of the following:  persistent dizziness, light-headedness, or visual disturbances     Notify your health care provider if you experience any of the following:  increased confusion or  weakness     Activity as tolerated

## 2022-09-28 NOTE — OP NOTE
"Procedure Note    Procedure Date: 9/28/2022    Procedure Performed:  L5/S1 lumbar interlaminar epidural steroid injection under fluoroscopy.    Indications: Patient has failed conservative therapy.      Pre-op diagnosis: Lumbar Radiculopathy    Post-op diagnosis: same    Physician: Benny Pritchard MD    IV anxiolysis medications: none    Medications injected: depomedrol 80mg, 1% Lidocaine 1ml, 2 mL sterile, preservative-free normal saline.    Local anesthetic used: 1% Lidocaine, 1 ml, 8.4% sodium bicarbonate 0.25ml    Estimated Blood Loss: none    Complications:  none    Technique:  The patient was interviewed in the holding area and Risks/Benefits were discussed, including, but not limited to, the possibility of new or different pain, bleeding or infection.   All questions were answered.  The patient understood and accepted risks.  Consent was verfied.  A time-out was taken to identify patient and procedure prior to starting the procedure. The patient was placed in the prone position on the fluoroscopy table. The area of the lumbar spine was prepped with Chloraprep and draped in a sterile manner. The L5/S1 interspace was identified and marked under AP fluoroscopy. The skin and subcutaneous tissues overlying the targeted interspace were anesthetized with 3-5 mL of 1% lidocaine using a 25G, 1.5" needle.  A 20G, 3.5" Tuohy epidural needle was directed toward the interspace under fluoroscopic guidance until the ligamentum flavum was engaged. From this point, a loss of resistance technique with a glass syringe and saline was used to identify entrance of the needle into the epidural space. Once loss of resistance was observed 1 mL of contrast solution was injected. An appropriate epidurogram was noted.  A 4 mL mixture consisting of saline, 1 mL 1% Lidocaine and 80 mg of depomedrol was injected slowly and without resistance.  The needle was flushed with normal saline and removed. The contrast was seen to be displaced after " injection. Patient was awake/responsive during all injections.  The patient tolerated the procedure well and was transferred to the [facility] in stable condition.  The patient was monitored after the procedure and was given post-procedure and discharge instructions to follow at home. The patient was discharged in a stable condition.

## 2022-09-29 ENCOUNTER — PATIENT MESSAGE (OUTPATIENT)
Dept: PAIN MEDICINE | Facility: CLINIC | Age: 57
End: 2022-09-29
Payer: OTHER GOVERNMENT

## 2022-09-29 VITALS
HEIGHT: 70 IN | RESPIRATION RATE: 16 BRPM | BODY MASS INDEX: 31.21 KG/M2 | SYSTOLIC BLOOD PRESSURE: 138 MMHG | DIASTOLIC BLOOD PRESSURE: 78 MMHG | HEART RATE: 68 BPM | WEIGHT: 218 LBS | TEMPERATURE: 97 F | OXYGEN SATURATION: 98 %

## 2022-10-03 ENCOUNTER — CLINICAL SUPPORT (OUTPATIENT)
Dept: REHABILITATION | Facility: HOSPITAL | Age: 57
End: 2022-10-03
Payer: OTHER GOVERNMENT

## 2022-10-03 DIAGNOSIS — M54.41 CHRONIC RIGHT-SIDED LOW BACK PAIN WITH RIGHT-SIDED SCIATICA: Primary | ICD-10-CM

## 2022-10-03 DIAGNOSIS — M62.81 MUSCLE WEAKNESS: ICD-10-CM

## 2022-10-03 DIAGNOSIS — R26.2 DIFFICULTY WALKING: ICD-10-CM

## 2022-10-03 DIAGNOSIS — G89.29 CHRONIC RIGHT-SIDED LOW BACK PAIN WITH RIGHT-SIDED SCIATICA: Primary | ICD-10-CM

## 2022-10-03 PROCEDURE — 97110 THERAPEUTIC EXERCISES: CPT | Mod: PN,CQ

## 2022-10-03 PROCEDURE — 97140 MANUAL THERAPY 1/> REGIONS: CPT | Mod: PN,CQ

## 2022-10-03 NOTE — PROGRESS NOTES
"[facility]   Physical Therapy Treatment Note     Name: Bob Farmer  [facility] Number: [id number]    Therapy Diagnosis:   Encounter Diagnoses   Name Primary?    Chronic right-sided low back pain with right-sided sciatica Yes    Muscle weakness     Difficulty walking      Physician: Benny Pritchard MD    Visit Date: 10/3/2022    Physician Orders: PT Eval and Treat   Medical Diagnosis from Referral: M54.16 (ICD-10-CM) - Lumbar radiculopathy  Evaluation Date: 9/22/2022  Authorization Period Expiration: 3-14-23  Plan of Care Expiration: 11-17-22  Progress Note Due: 10-22-22  Visit # / Visits authorized: 1/ 14 (+1 from eval)  FOTO: Issued Visit #: 1     MD Follow up appointment: 10-5-22     Precautions: Standard weakness of EHL    PTA Visit #: 1/5     Time In: 4:36  Time Out: 5:19  Total Billable Time: 40 minutes    SUBJECTIVE     Pt reports: back feels continued pain and soreness centrally. Reports tightness from posterior to anterior hip with getting into/out of his lifted Jeep. Feels like the injection has helped some, but is still unsure.   He was compliant with home exercise program.  Response to previous treatment: felt okay  Functional change: too soon to tell    Pain: 2/10  Location: right hip/low back    OBJECTIVE     Objective Measures updated at progress report unless specified.     Treatment     Don received the treatments listed below:      therapeutic exercises to develop flexibility, core stabilization, and Zina ext principles for 30 minutes including:  Instructed pt in spine and pelvic girdle anatomy and biomechanics and effect of flexion and extension principles.    Prone press up x 10 q 2 hours  Supine active HSS (cues to keep arms extended to prevent excess hip flexion) x 10  Prone prop on elbows 2 x 1 min  Supine LTR x 10 small range, emphasis on pain free ROM  Supine piriforms stretch (push knee away) 3x20" R only  GSS standing x 10.      manual therapy techniques: " Myofacial release and Soft tissue Mobilization were applied to the: low back and R posterior hip for 10 minutes, including:  STM along lumbar paraspinals, R glut med and piriformis with patient prone and pillow under stomach        Patient Education and Home Exercises     Home Exercises Provided and Patient Education Provided     Education provided:   - stretching to point of tightness, not pain  - use of prone on elbows or prone press ups to manage pain as needed  - avoidance of pain with exercises    Written Home Exercises Provided: yes. Exercises were reviewed and Oneil was able to demonstrate them prior to the end of the session.  Oneil demonstrated good  understanding of the education provided. See EMR under Patient Instructions in Notes section for exercises provided during therapy sessions    ASSESSMENT     Patient presents with continued lumbar stiffness and limited mobility into all planes of motion. Point tenderness along R sided lumbar paraspinals along L1-L3 with manual techniques, limited lumbar mobility present. Minimal relief of symptoms by end of session and will benefit from initiation of strengthening attempts next session for hip and core strengthening.    Oneil Is progressing well towards his goals.   Pt prognosis is Good.     Pt will continue to benefit from skilled outpatient physical therapy to address the deficits listed in the problem list box on initial evaluation, provide pt/family education and to maximize pt's level of independence in the home and community environment.     Pt's spiritual, cultural and educational needs considered and pt agreeable to plan of care and goals.     Anticipated barriers to physical therapy: work schedule    Goals:   Short Term Goals:  4 weeks  Increase range of motion 25%  Increase strength 1/2 muscle grade  Centralize radicular symptoms R LE  Be able to perform HEP with minimal cueing required     Long Term Goals: 8 weeks  Increase range of motion to 75% to 100%  full   Improve muscle strength 1 muscle grade  Restore ability to ambulate with normal pain free gait  Walking for ADL and work will be restored without increased pain  Stair climbing in a reciprocal manner will be restored without increased pain   Restore ability to stand for ADL and work without increased pain  Restore ability to perform sit to stand transfer without increased pain  Restore normal sleep habits without disturbances due to pain  Restore ability to perform ADL's and household activities independently and without increased pain       PLAN     Continue with current POC toward physical therapy goals established at initial evaluation.    Payton Leung, PTA

## 2022-10-06 ENCOUNTER — CLINICAL SUPPORT (OUTPATIENT)
Dept: REHABILITATION | Facility: HOSPITAL | Age: 57
End: 2022-10-06
Payer: OTHER GOVERNMENT

## 2022-10-06 DIAGNOSIS — G89.29 CHRONIC RIGHT-SIDED LOW BACK PAIN WITH RIGHT-SIDED SCIATICA: Primary | ICD-10-CM

## 2022-10-06 DIAGNOSIS — M54.41 CHRONIC RIGHT-SIDED LOW BACK PAIN WITH RIGHT-SIDED SCIATICA: Primary | ICD-10-CM

## 2022-10-06 DIAGNOSIS — M62.81 MUSCLE WEAKNESS: ICD-10-CM

## 2022-10-06 DIAGNOSIS — R26.2 DIFFICULTY WALKING: ICD-10-CM

## 2022-10-06 PROCEDURE — 97110 THERAPEUTIC EXERCISES: CPT | Mod: PN,CQ

## 2022-10-06 PROCEDURE — 97140 MANUAL THERAPY 1/> REGIONS: CPT | Mod: PN,CQ

## 2022-10-06 NOTE — PROGRESS NOTES
"[facility]   Physical Therapy Treatment Note     Name: Bob Farmer  [facility] Number: [id number]    Therapy Diagnosis:   Encounter Diagnoses   Name Primary?    Chronic right-sided low back pain with right-sided sciatica Yes    Muscle weakness     Difficulty walking        Physician: Benny Pritchard MD    Visit Date: 10/6/2022    Physician Orders: PT Eval and Treat   Medical Diagnosis from Referral: M54.16 (ICD-10-CM) - Lumbar radiculopathy  Evaluation Date: 9/22/2022  Authorization Period Expiration: 3-14-23  Plan of Care Expiration: 11-17-22  Progress Note Due: 10-22-22  Visit # / Visits authorized: 2 / 14 (+1 from eval)  FOTO: Issued Visit #: 1     MD Follow up appointment: 10-5-22     Precautions: Standard weakness of EHL    PTA Visit #: 1/5     Time In: 4:07  Time Out: 4:53  Total Billable Time: 45 minutes    SUBJECTIVE     Pt reports: overall the back feels a bit better, less pain radiating into the anteiror hip but does still get a pinching feeling to the anterior R hip when flexing the right hip/lifting the L leg.    He was compliant with home exercise program.  Response to previous treatment: felt pretty good  Functional change: less frequency of pain    Pain: 1-2/10  Location: right hip/low back    OBJECTIVE     Objective Measures updated at progress report unless specified.     Treatment     Don received the treatments listed below:      therapeutic exercises to develop flexibility, core stabilization, and Zina ext principles for 35 minutes including:  Instructed pt in spine and pelvic girdle anatomy and biomechanics and effect of flexion and extension principles.     Supine hip flexor stretch off EOM 3x30 sec   Prone press up x 10 q 2 hours  Prone hip ext x 10 each  (NP) Prone prop on elbows 2 x 1 min  Supine active HSS (cues to keep arms extended to prevent excess hip flexion) x 10  Supine piriforms stretch (push knee away) 3x20" R only   TA set x 10 with 5 count, keep " neutral spine   Bridge x 10 with 5 count   Supine LTR x 10 small range, emphasis on pain free ROM  GSS standing x 10.      Possible for next session:  glut set, SLR    manual therapy techniques: Myofacial release and Soft tissue Mobilization were applied to the: low back and R posterior hip for 10 minutes, including:  STM to TFL and inferior femoral glide in supine  STM along lumbar paraspinals and R glut med with patient prone and pillow under stomach        Patient Education and Home Exercises     Home Exercises Provided and Patient Education Provided     Education provided:   - stretching to point of tightness, not pain  - use of prone on elbows or prone press ups to manage pain as needed  - avoidance of pain with exercises    Written Home Exercises Provided: yes. Exercises were reviewed and Oneil was able to demonstrate them prior to the end of the session.  Oneil demonstrated good  understanding of the education provided. See EMR under Patient Instructions in Notes section for exercises provided during therapy sessions    ASSESSMENT     Patient with overall good tolerance to progression of exercises today, reduction of symptoms by end of session however some discomfort remains present to the R sided low back. Reduced anterior hip pinch following manual techniques but tenderness remained. Weakness of R > L gluteals with prone hip extension, discomfort reported at R glut med origin. Overall less tightness and tenderness to the lumbar musculature. Will benefit from further progression for core and hip strengthening as able.    Oneil Is progressing well towards his goals.   Pt prognosis is Good.     Pt will continue to benefit from skilled outpatient physical therapy to address the deficits listed in the problem list box on initial evaluation, provide pt/family education and to maximize pt's level of independence in the home and community environment.     Pt's spiritual, cultural and educational needs considered and pt  agreeable to plan of care and goals.     Anticipated barriers to physical therapy: work schedule    Goals:   Short Term Goals:  4 weeks  Increase range of motion 25%  Increase strength 1/2 muscle grade  Centralize radicular symptoms R LE  Be able to perform HEP with minimal cueing required     Long Term Goals: 8 weeks  Increase range of motion to 75% to 100% full   Improve muscle strength 1 muscle grade  Restore ability to ambulate with normal pain free gait  Walking for ADL and work will be restored without increased pain  Stair climbing in a reciprocal manner will be restored without increased pain   Restore ability to stand for ADL and work without increased pain  Restore ability to perform sit to stand transfer without increased pain  Restore normal sleep habits without disturbances due to pain  Restore ability to perform ADL's and household activities independently and without increased pain       PLAN     Continue with current POC toward physical therapy goals established at initial evaluation.    Payton Leung, PTA

## 2022-10-10 ENCOUNTER — CLINICAL SUPPORT (OUTPATIENT)
Dept: REHABILITATION | Facility: HOSPITAL | Age: 57
End: 2022-10-10
Payer: OTHER GOVERNMENT

## 2022-10-10 DIAGNOSIS — M54.41 CHRONIC RIGHT-SIDED LOW BACK PAIN WITH RIGHT-SIDED SCIATICA: Primary | ICD-10-CM

## 2022-10-10 DIAGNOSIS — R26.2 DIFFICULTY WALKING: ICD-10-CM

## 2022-10-10 DIAGNOSIS — M62.81 MUSCLE WEAKNESS: ICD-10-CM

## 2022-10-10 DIAGNOSIS — G89.29 CHRONIC RIGHT-SIDED LOW BACK PAIN WITH RIGHT-SIDED SCIATICA: Primary | ICD-10-CM

## 2022-10-10 PROCEDURE — 97140 MANUAL THERAPY 1/> REGIONS: CPT | Mod: PN,CQ

## 2022-10-10 PROCEDURE — 97110 THERAPEUTIC EXERCISES: CPT | Mod: PN,CQ

## 2022-10-10 NOTE — PROGRESS NOTES
"[facility]   Physical Therapy Treatment Note     Name: Bob Farmer  [facility] Number: [id number]    Therapy Diagnosis:   Encounter Diagnoses   Name Primary?    Chronic right-sided low back pain with right-sided sciatica Yes    Muscle weakness     Difficulty walking        Physician: Benny Pritchard MD    Visit Date: 10/10/2022    Physician Orders: PT Eval and Treat   Medical Diagnosis from Referral: M54.16 (ICD-10-CM) - Lumbar radiculopathy  Evaluation Date: 9/22/2022  Authorization Period Expiration: 3-14-23  Plan of Care Expiration: 11-17-22  Progress Note Due: 10-22-22  Visit # / Visits authorized: 2 / 14 (+1 from eval)  FOTO: Issued Visit #: 1     MD Follow up appointment: 10-5-22     Precautions: Standard weakness of EHL    PTA Visit #: 1/5     Time In: 4:35  Time Out: 5:20  Total Billable Time: 45 minutes    SUBJECTIVE     Pt reports: increased stiffness and soreness throughout the back from moving furniture in/out of 2 bedrooms over the weekend.   He was compliant with home exercise program.  Response to previous treatment: felt pretty good  Functional change: less frequency of pain    Pain: 1-2/10  Location: right hip/low back    OBJECTIVE     Objective Measures updated at progress report unless specified.     Treatment     Don received the treatments listed below:      therapeutic exercises to develop flexibility, core stabilization, and Zina ext principles for 35 minutes including:  Instructed pt in spine and pelvic girdle anatomy and biomechanics and effect of flexion and extension principles.    (NP) Supine hip flexor stretch off EOM 3x30 sec   Prone press up x 10 q 2 hours  Prone hip ext x 10 each  Supine active HSS (cues to keep arms extended to prevent excess hip flexion) x 10  Supine piriforms stretch (push knee away) 3x20" R only  TA set x 10 with 5 count, keep neutral spine  Bridge x 15 with 5 count   Supine LTR x 10 small range, emphasis on pain free ROM   SLR x 10 " each  GSS standing x 10    Possible for next session:  glut set    manual therapy techniques: Myofacial release and Soft tissue Mobilization were applied to the: low back and R posterior hip for 10 minutes, including:  Cupping/gliding to bilateral lumbar paraspinals with silicone cup  STM along lumbar paraspinals and R glut med with patient prone and pillow under stomach  (NP) STM to TFL and inferior femoral glide in supine        Patient Education and Home Exercises     Home Exercises Provided and Patient Education Provided     Education provided:   - stretching to point of tightness, not pain  - use of prone on elbows or prone press ups to manage pain as needed  - avoidance of pain with exercises    Written Home Exercises Provided: yes. Exercises were reviewed and Oneil was able to demonstrate them prior to the end of the session.  Oneil demonstrated good  understanding of the education provided. See EMR under Patient Instructions in Notes section for exercises provided during therapy sessions    ASSESSMENT     Patient continues with lumbar paraspinal soreness throughout session, lessened some following manual techniques and reported less stiffness by end of session. Occasional cues with bridges to avoid lumbar compensation. Improving core and hip strength with exercises however will further benefit from continued progression for strength as able. Patient without radicular symptoms or paresthesias into the R hip or LE today.    Oneil Is progressing well towards his goals.   Pt prognosis is Good.     Pt will continue to benefit from skilled outpatient physical therapy to address the deficits listed in the problem list box on initial evaluation, provide pt/family education and to maximize pt's level of independence in the home and community environment.     Pt's spiritual, cultural and educational needs considered and pt agreeable to plan of care and goals.     Anticipated barriers to physical therapy: work  schedule    Goals:   Short Term Goals:  4 weeks  Increase range of motion 25%  Increase strength 1/2 muscle grade  Centralize radicular symptoms R LE  Be able to perform HEP with minimal cueing required     Long Term Goals: 8 weeks  Increase range of motion to 75% to 100% full   Improve muscle strength 1 muscle grade  Restore ability to ambulate with normal pain free gait  Walking for ADL and work will be restored without increased pain  Stair climbing in a reciprocal manner will be restored without increased pain   Restore ability to stand for ADL and work without increased pain  Restore ability to perform sit to stand transfer without increased pain  Restore normal sleep habits without disturbances due to pain  Restore ability to perform ADL's and household activities independently and without increased pain       PLAN     Continue with current POC toward physical therapy goals established at initial evaluation.    Payton Leung, PTA          present

## 2023-01-24 ENCOUNTER — OFFICE VISIT (OUTPATIENT)
Dept: CARDIOLOGY | Facility: CLINIC | Age: 58
End: 2023-01-24
Payer: OTHER GOVERNMENT

## 2023-01-24 VITALS
BODY MASS INDEX: 31.25 KG/M2 | HEIGHT: 70 IN | DIASTOLIC BLOOD PRESSURE: 75 MMHG | WEIGHT: 218.25 LBS | HEART RATE: 80 BPM | SYSTOLIC BLOOD PRESSURE: 137 MMHG

## 2023-01-24 DIAGNOSIS — I10 ESSENTIAL HYPERTENSION: Chronic | ICD-10-CM

## 2023-01-24 DIAGNOSIS — E78.00 HYPERCHOLESTEROLEMIA: Chronic | ICD-10-CM

## 2023-01-24 DIAGNOSIS — E66.9 OBESITY, CLASS I, BMI 30-34.9: Chronic | ICD-10-CM

## 2023-01-24 DIAGNOSIS — R06.02 SOB (SHORTNESS OF BREATH): Primary | ICD-10-CM

## 2023-01-24 DIAGNOSIS — I49.49 PREMATURE BEATS: Chronic | ICD-10-CM

## 2023-01-24 DIAGNOSIS — I34.0 NONRHEUMATIC MITRAL VALVE REGURGITATION: Chronic | ICD-10-CM

## 2023-01-24 PROCEDURE — 99214 PR OFFICE/OUTPT VISIT, EST, LEVL IV, 30-39 MIN: ICD-10-PCS | Mod: S$PBB,,, | Performed by: INTERNAL MEDICINE

## 2023-01-24 PROCEDURE — 99999 PR PBB SHADOW E&M-EST. PATIENT-LVL III: ICD-10-PCS | Mod: PBBFAC,,, | Performed by: INTERNAL MEDICINE

## 2023-01-24 PROCEDURE — 99213 OFFICE O/P EST LOW 20 MIN: CPT | Mod: PBBFAC,PO | Performed by: INTERNAL MEDICINE

## 2023-01-24 PROCEDURE — 99214 OFFICE O/P EST MOD 30 MIN: CPT | Mod: S$PBB,,, | Performed by: INTERNAL MEDICINE

## 2023-01-24 PROCEDURE — 99999 PR PBB SHADOW E&M-EST. PATIENT-LVL III: CPT | Mod: PBBFAC,,, | Performed by: INTERNAL MEDICINE

## 2023-01-24 NOTE — PROGRESS NOTES
Subjective:    Patient ID:  Bob Farmer is a 57 y.o. male who presents for Follow-up (Nonrheumatic mitral valve regurgitation) and Hypertension        HPI    NO FOLLOW-UP SINCE OCTOBER 2021, NO RECENT LABS, MULTIPLE VACCINATION, DOING OK,SOME [place], RECENT DEATH OF AN AUNT AT 70 WITH CHF,  SEE ROS  Past Medical History:   Diagnosis Date    Allergy     Hyperlipemia     Hypertension      Past Surgical History:   Procedure Laterality Date    COLONOSCOPY  02/22/2019    Dr Lofton, received report     EPIDURAL STEROID INJECTION INTO LUMBAR SPINE N/A 9/28/2022    Procedure: Injection-steroid-epidural-lumbar L5/S1 to right;  Surgeon: Benny Pritchard MD;  Location: [facility];  Service: Pain Management;  Laterality: N/A;    HERNIA REPAIR       Family History   Problem Relation Age of Onset    Hypertension Mother     Diabetes Father      Social History     Socioeconomic History    Marital status:    Tobacco Use    Smoking status: Never    Smokeless tobacco: Never   Substance and Sexual Activity    Alcohol use: Yes     Alcohol/week: 0.0 standard drinks     Comment: occasional    Drug use: No    Sexual activity: Yes     Partners: Female     Social Determinants of Health     Financial Resource Strain: Low Risk     Difficulty of Paying Living Expenses: Not hard at all   Food Insecurity: No Food Insecurity    Worried About Running Out of Food in the Last Year: Never true    Ran Out of Food in the Last Year: Never true   Transportation Needs: No Transportation Needs    Lack of Transportation (Medical): No    Lack of Transportation (Non-Medical): No   Physical Activity: Inactive    Days of Exercise per Week: 0 days    Minutes of Exercise per Session: 0 min   Stress: No Stress Concern Present    Feeling of Stress : Only a little   Social Connections: Unknown    Frequency of Communication with Friends and Family: Once a week    Frequency of Social Gatherings with Friends and Family: Never    Active Member of Clubs or  Organizations: No    Attends Club or Organization Meetings: Never    Marital Status:    Housing Stability: Low Risk     Unable to Pay for Housing in the Last Year: No    Number of Places Lived in the Last Year: 1    Unstable Housing in the Last Year: No       Review of patient's allergies indicates:  No Known Allergies    Current Outpatient Medications:     amLODIPine (NORVASC) 5 MG tablet, Take 1 tablet by mouth once daily, Disp: 90 tablet, Rfl: 0    atorvastatin (LIPITOR) 20 MG tablet, Take 1 tablet by mouth once daily, Disp: 90 tablet, Rfl: 0    carvediloL (COREG) 3.125 MG tablet, TAKE 1 TABLET BY MOUTH TWICE DAILY WITH MEALS, Disp: 180 tablet, Rfl: 0    FLUoxetine 40 MG capsule, Take 1 capsule (40 mg total) by mouth once daily., Disp: 90 capsule, Rfl: 0    Review of Systems   Constitutional: Negative for chills, diaphoresis, fever, malaise/fatigue and night sweats.   HENT: Negative.     Eyes:  Negative for blurred vision and visual disturbance.   Cardiovascular:  Negative for chest pain, claudication, cyanosis, dyspnea on exertion (MILD), irregular heartbeat (RARE), leg swelling, near-syncope, orthopnea, palpitations, paroxysmal nocturnal dyspnea and syncope.   Respiratory:  Negative for cough, hemoptysis, shortness of breath (SOME) and wheezing.    Endocrine: Negative.    Hematologic/Lymphatic: Negative for adenopathy. Does not bruise/bleed easily.   Skin:  Negative for itching and rash.   Musculoskeletal:  Negative for back pain and falls. Joint pain: KNEES, [facility].  Gastrointestinal:  Negative for abdominal pain, change in bowel habit, dysphagia, jaundice, melena, nausea and vomiting.   Genitourinary:  Negative for dysuria and flank pain.   Neurological:  Negative for brief paralysis, dizziness, focal weakness, loss of balance, numbness and weakness.   Psychiatric/Behavioral:  Negative for altered mental status, depression and memory loss.    Allergic/Immunologic: Negative.      "  Objective:      Vitals:    01/24/23 1352   BP: 137/75   Pulse: 80   Weight: 99 kg (218 lb 4.1 oz)   Height: 5' 10" (1.778 m)   PainSc: 0-No pain     Body mass index is 31.32 kg/m².    Physical Exam  Constitutional:       Appearance: He is well-developed. He is obese.   HENT:      Head: Normocephalic and atraumatic.   Eyes:      General: No scleral icterus.     Extraocular Movements: Extraocular movements intact.      Pupils: Pupils are equal, round, and reactive to light.   Neck:      Vascular: No carotid bruit or JVD.   Cardiovascular:      Rate and Rhythm: Normal rate and regular rhythm. No extrasystoles are present.     Pulses:           Carotid pulses are 2+ on the right side and 2+ on the left side.       Radial pulses are 2+ on the right side and 2+ on the left side.        Femoral pulses are 2+ on the right side and 2+ on the left side.       Posterior tibial pulses are 2+ on the right side and 2+ on the left side.      Heart sounds: Murmur heard.   Systolic murmur is present with a grade of 1/6 at the lower left sternal border and apex.     No friction rub. No gallop.   Pulmonary:      Effort: Pulmonary effort is normal.      Breath sounds: Normal breath sounds. No rales.   Abdominal:      Palpations: Abdomen is soft. There is no hepatomegaly.      Tenderness: There is no abdominal tenderness.      Hernia: A hernia is present. There is no hernia in the umbilical area.   Musculoskeletal:         General: No deformity. Normal range of motion.      Cervical back: Neck supple.      Comments: NO ANKLE EDEMA, VARICOSE VEINS   Skin:     General: Skin is warm and dry.      Capillary Refill: Capillary refill takes less than 2 seconds.   Neurological:      General: No focal deficit present.      Mental Status: He is alert and oriented to person, place, and time.   Psychiatric:         Mood and Affect: Mood normal.         Speech: Speech normal.         Behavior: Behavior normal.               ..    Chemistry      "   Component Value Date/Time     02/02/2021 1155     08/01/2015 0730    K 4.8 02/02/2021 1155    K 4.9 08/01/2015 0730     02/02/2021 1155     08/01/2015 0730    CO2 26 02/02/2021 1155    BUN 21 02/02/2021 1155    CREATININE 0.88 02/02/2021 1155    CREATININE 1.01 08/01/2015 0730     (H) 02/02/2021 1155        Component Value Date/Time    CALCIUM 9.7 02/02/2021 1155    ALKPHOS 119 02/02/2021 1155    AST 50 02/02/2021 1155    ALT 23 02/02/2021 1155    BILITOT 1.6 (H) 02/02/2021 1155    ESTGFRAFRICA >60 02/02/2021 1155    EGFRNONAA >60 02/02/2021 1155            ..  Lab Results   Component Value Date    CHOL 184 08/14/2020    CHOL 201 (H) 08/10/2019    CHOL 149 02/02/2019     Lab Results   Component Value Date    HDL 47 08/14/2020    HDL 49 08/10/2019    HDL 37 (L) 02/02/2019     Lab Results   Component Value Date    LDLCALC 116 (H) 08/14/2020    LDLCALC 132 (H) 08/10/2019    LDLCALC 90 02/02/2019     Lab Results   Component Value Date    TRIG 105 08/14/2020    TRIG 98 08/10/2019    TRIG 121 02/02/2019     Lab Results   Component Value Date    CHOLHDL 4.1 08/10/2019    CHOLHDL 4.0 02/02/2019     ..  Lab Results   Component Value Date    WBC 13.06 (H) 02/02/2021    HGB 16.3 02/02/2021    HCT 45.6 02/02/2021    MCV 87 02/02/2021     02/02/2021       Test(s) Reviewed  I have reviewed the following in detail:  [] Stress test   [] Angiography   [] Echocardiogram   [] Labs   [] Other:       Assessment:         ICD-10-CM ICD-9-CM   1. SOB (shortness of breath)  R06.02 786.05   2. Nonrheumatic mitral valve regurgitation  I34.0 424.0   3. Premature beats  I49.49 427.60   4. Hypercholesterolemia  E78.00 272.0   5. Essential hypertension  I10 401.9   6. Obesity, Class I, BMI 30-34.9  E66.9 278.00     Problem List Items Addressed This Visit          Pulmonary    SOB (shortness of breath) - Primary    Relevant Orders    Stress Echo Which stress agent will be used? Treadmill Exercise; Color Flow  Doppler? Yes    CBC Auto Differential       Cardiac/Vascular    Essential hypertension    Relevant Orders    Comprehensive Metabolic Panel    Hypercholesterolemia    Relevant Orders    Comprehensive Metabolic Panel    Lipid Panel    Nonrheumatic mitral valve regurgitation    Relevant Orders    Stress Echo Which stress agent will be used? Treadmill Exercise; Color Flow Doppler? Yes    Premature beats    Relevant Orders    Stress Echo Which stress agent will be used? Treadmill Exercise; Color Flow Doppler? Yes    TSH       Endocrine    Obesity, Class I, BMI 30-34.9    Relevant Orders    TSH        Plan:         WILL NEED FURTHER EVALUATION CHECK LABS STRESS ECHO, NO OVERT HEART FAILURE NO TIA SYMPTOMS BENIGN ARRHYTHMIA DIET EXERCISE WEIGHT LOSS, RETURN TO CLINIC IN 4 MO  SOB (shortness of breath)  Comments:  REASSESS  Orders:  -     Stress Echo Which stress agent will be used? Treadmill Exercise; Color Flow Doppler? Yes; Future  -     CBC Auto Differential; Future; Expected date: 01/24/2023    Nonrheumatic mitral valve regurgitation  -     Stress Echo Which stress agent will be used? Treadmill Exercise; Color Flow Doppler? Yes; Future    Premature beats  Comments:  MINOR  Orders:  -     Stress Echo Which stress agent will be used? Treadmill Exercise; Color Flow Doppler? Yes; Future  -     TSH; Future; Expected date: 01/24/2023    Hypercholesterolemia  -     Comprehensive Metabolic Panel; Future; Expected date: 01/24/2023  -     Lipid Panel; Future; Expected date: 01/24/2023    Essential hypertension  Comments:  CONTROLLED  Orders:  -     Comprehensive Metabolic Panel; Future; Expected date: 01/24/2023    Obesity, Class I, BMI 30-34.9  -     TSH; Future; Expected date: 01/24/2023    RTC Low level/low impact aerobic exercise 5x's/wk. Heart healthy diet and risk factor modification.    See labs and med orders.    Aerobic exercise 5x's/wk. Heart healthy diet and risk factor modification.    See labs and med  orders.

## 2023-02-13 ENCOUNTER — CLINICAL SUPPORT (OUTPATIENT)
Dept: CARDIOLOGY | Facility: HOSPITAL | Age: 58
End: 2023-02-13
Attending: INTERNAL MEDICINE
Payer: OTHER GOVERNMENT

## 2023-02-13 ENCOUNTER — LAB VISIT (OUTPATIENT)
Dept: LAB | Facility: HOSPITAL | Age: 58
End: 2023-02-13
Attending: INTERNAL MEDICINE
Payer: COMMERCIAL

## 2023-02-13 VITALS — BODY MASS INDEX: 31.21 KG/M2 | WEIGHT: 218 LBS | HEIGHT: 70 IN

## 2023-02-13 DIAGNOSIS — E66.9 OBESITY, CLASS I, BMI 30-34.9: Chronic | ICD-10-CM

## 2023-02-13 DIAGNOSIS — I34.0 NONRHEUMATIC MITRAL VALVE REGURGITATION: ICD-10-CM

## 2023-02-13 DIAGNOSIS — I10 ESSENTIAL HYPERTENSION: Chronic | ICD-10-CM

## 2023-02-13 DIAGNOSIS — R06.02 SOB (SHORTNESS OF BREATH): ICD-10-CM

## 2023-02-13 DIAGNOSIS — E78.00 HYPERCHOLESTEROLEMIA: Chronic | ICD-10-CM

## 2023-02-13 DIAGNOSIS — I49.49 PREMATURE BEATS: Chronic | ICD-10-CM

## 2023-02-13 DIAGNOSIS — I49.49 PREMATURE BEATS: ICD-10-CM

## 2023-02-13 PROBLEM — G89.29 CHRONIC PAIN OF BOTH KNEES: Status: RESOLVED | Noted: 2021-02-26 | Resolved: 2023-02-13

## 2023-02-13 PROBLEM — M25.562 CHRONIC PAIN OF BOTH KNEES: Status: RESOLVED | Noted: 2021-02-26 | Resolved: 2023-02-13

## 2023-02-13 PROBLEM — F33.9 MAJOR DEPRESSIVE DISORDER, RECURRENT, UNSPECIFIED: Status: ACTIVE | Noted: 2023-02-13

## 2023-02-13 PROBLEM — M54.16 RADICULOPATHY, LUMBAR REGION: Status: ACTIVE | Noted: 2023-02-13

## 2023-02-13 PROBLEM — R51.9 HEADACHE, UNSPECIFIED: Status: ACTIVE | Noted: 2020-11-29

## 2023-02-13 PROBLEM — M25.561 CHRONIC PAIN OF BOTH KNEES: Status: RESOLVED | Noted: 2021-02-26 | Resolved: 2023-02-13

## 2023-02-13 PROBLEM — Z46.1 ENCOUNTER FOR FITTING AND ADJUSTMENT OF HEARING AID: Status: ACTIVE | Noted: 2023-02-13

## 2023-02-13 PROBLEM — M19.90 ARTHRITIS: Status: ACTIVE | Noted: 2023-02-13

## 2023-02-13 PROBLEM — M17.0 BILATERAL PRIMARY OSTEOARTHRITIS OF KNEE: Status: ACTIVE | Noted: 2020-01-21

## 2023-02-13 PROBLEM — I05.9 MITRAL VALVE DISORDER: Status: ACTIVE | Noted: 2023-02-13

## 2023-02-13 PROBLEM — E78.5 HYPERLIPIDEMIA: Status: ACTIVE | Noted: 2023-02-13

## 2023-02-13 LAB
ALBUMIN SERPL BCP-MCNC: 4.2 G/DL (ref 3.5–5.2)
ALP SERPL-CCNC: 118 U/L (ref 55–135)
ALT SERPL W/O P-5'-P-CCNC: 30 U/L (ref 10–44)
ANION GAP SERPL CALC-SCNC: 13 MMOL/L (ref 8–16)
ASCENDING AORTA: 3.32 CM
AST SERPL-CCNC: 22 U/L (ref 10–40)
AV INDEX (PROSTH): 1.14
AV MEAN GRADIENT: 3 MMHG
AV PEAK GRADIENT: 5 MMHG
AV VALVE AREA: 4.2 CM2
AV VELOCITY RATIO: 0.96
BASOPHILS # BLD AUTO: 0.06 K/UL (ref 0–0.2)
BASOPHILS NFR BLD: 0.9 % (ref 0–1.9)
BILIRUB SERPL-MCNC: 0.6 MG/DL (ref 0.1–1)
BSA FOR ECHO PROCEDURE: 2.21 M2
BUN SERPL-MCNC: 19 MG/DL (ref 6–20)
CALCIUM SERPL-MCNC: 9.4 MG/DL (ref 8.7–10.5)
CHLORIDE SERPL-SCNC: 104 MMOL/L (ref 95–110)
CHOLEST SERPL-MCNC: 193 MG/DL (ref 120–199)
CHOLEST/HDLC SERPL: 4.5 {RATIO} (ref 2–5)
CO2 SERPL-SCNC: 23 MMOL/L (ref 23–29)
CREAT SERPL-MCNC: 1 MG/DL (ref 0.5–1.4)
CV ECHO LV RWT: 0.45 CM
CV STRESS BASE HR: 82 BPM
DIASTOLIC BLOOD PRESSURE: 84 MMHG
DIFFERENTIAL METHOD: NORMAL
DOP CALC AO PEAK VEL: 1.07 M/S
DOP CALC AO VTI: 22.8 CM
DOP CALC LVOT AREA: 3.7 CM2
DOP CALC LVOT DIAMETER: 2.17 CM
DOP CALC LVOT PEAK VEL: 1.03 M/S
DOP CALC LVOT STROKE VOLUME: 95.74 CM3
DOP CALCLVOT PEAK VEL VTI: 25.9 CM
E WAVE DECELERATION TIME: 180.24 MSEC
E/A RATIO: 1.05
E/E' RATIO: 17 M/S
ECHO LV POSTERIOR WALL: 1.11 CM (ref 0.6–1.1)
EJECTION FRACTION: 65 %
EOSINOPHIL # BLD AUTO: 0.3 K/UL (ref 0–0.5)
EOSINOPHIL NFR BLD: 4.4 % (ref 0–8)
ERYTHROCYTE [DISTWIDTH] IN BLOOD BY AUTOMATED COUNT: 12.9 % (ref 11.5–14.5)
EST. GFR  (NO RACE VARIABLE): >60 ML/MIN/1.73 M^2
FRACTIONAL SHORTENING: 37 % (ref 28–44)
GLUCOSE SERPL-MCNC: 97 MG/DL (ref 70–110)
HCT VFR BLD AUTO: 47.7 % (ref 40–54)
HDLC SERPL-MCNC: 43 MG/DL (ref 40–75)
HDLC SERPL: 22.3 % (ref 20–50)
HGB BLD-MCNC: 16.3 G/DL (ref 14–18)
IMM GRANULOCYTES # BLD AUTO: 0.03 K/UL (ref 0–0.04)
IMM GRANULOCYTES NFR BLD AUTO: 0.5 % (ref 0–0.5)
INTERVENTRICULAR SEPTUM: 1.09 CM (ref 0.6–1.1)
IVRT: 102.76 MSEC
LA MAJOR: 5.22 CM
LA MINOR: 4.71 CM
LA WIDTH: 4.2 CM
LDLC SERPL CALC-MCNC: 117.6 MG/DL (ref 63–159)
LEFT ATRIUM SIZE: 4.45 CM
LEFT ATRIUM VOLUME INDEX: 36.3 ML/M2
LEFT ATRIUM VOLUME: 78.67 CM3
LEFT INTERNAL DIMENSION IN SYSTOLE: 3.08 CM (ref 2.1–4)
LEFT VENTRICLE DIASTOLIC VOLUME INDEX: 52.37 ML/M2
LEFT VENTRICLE DIASTOLIC VOLUME: 113.65 ML
LEFT VENTRICLE MASS INDEX: 93 G/M2
LEFT VENTRICLE SYSTOLIC VOLUME INDEX: 17.2 ML/M2
LEFT VENTRICLE SYSTOLIC VOLUME: 37.35 ML
LEFT VENTRICULAR INTERNAL DIMENSION IN DIASTOLE: 4.92 CM (ref 3.5–6)
LEFT VENTRICULAR MASS: 201.82 G
LV LATERAL E/E' RATIO: 17 M/S
LV SEPTAL E/E' RATIO: 17 M/S
LVOT MG: 1.98 MMHG
LVOT MV: 0.65 CM/S
LYMPHOCYTES # BLD AUTO: 1.5 K/UL (ref 1–4.8)
LYMPHOCYTES NFR BLD: 22.6 % (ref 18–48)
MCH RBC QN AUTO: 30.9 PG (ref 27–31)
MCHC RBC AUTO-ENTMCNC: 34.2 G/DL (ref 32–36)
MCV RBC AUTO: 90 FL (ref 82–98)
MONOCYTES # BLD AUTO: 0.6 K/UL (ref 0.3–1)
MONOCYTES NFR BLD: 9.7 % (ref 4–15)
MV PEAK A VEL: 0.81 M/S
MV PEAK E VEL: 0.85 M/S
MV STENOSIS PRESSURE HALF TIME: 52.27 MS
MV VALVE AREA P 1/2 METHOD: 4.21 CM2
NEUTROPHILS # BLD AUTO: 4.1 K/UL (ref 1.8–7.7)
NEUTROPHILS NFR BLD: 61.9 % (ref 38–73)
NONHDLC SERPL-MCNC: 150 MG/DL
NRBC BLD-RTO: 0 /100 WBC
OHS CV CPX 1 MINUTE RECOVERY HEART RATE: 114 BPM
OHS CV CPX 85 PERCENT MAX PREDICTED HEART RATE MALE: 137
OHS CV CPX ESTIMATED METS: 10
OHS CV CPX MAX PREDICTED HEART RATE: 161
OHS CV CPX PATIENT IS FEMALE: 0
OHS CV CPX PATIENT IS MALE: 1
OHS CV CPX PEAK DIASTOLIC BLOOD PRESSURE: 80 MMHG
OHS CV CPX PEAK HEAR RATE: 150 BPM
OHS CV CPX PEAK RATE PRESSURE PRODUCT: ABNORMAL
OHS CV CPX PEAK SYSTOLIC BLOOD PRESSURE: 166 MMHG
OHS CV CPX PERCENT MAX PREDICTED HEART RATE ACHIEVED: 93
OHS CV CPX RATE PRESSURE PRODUCT PRESENTING: ABNORMAL
PISA MRMAX VEL: 4.82 M/S
PISA TR MAX VEL: 2.55 M/S
PLATELET # BLD AUTO: 277 K/UL (ref 150–450)
PMV BLD AUTO: 11.2 FL (ref 9.2–12.9)
POTASSIUM SERPL-SCNC: 5.7 MMOL/L (ref 3.5–5.1)
PROT SERPL-MCNC: 7.5 G/DL (ref 6–8.4)
PULM VEIN S/D RATIO: 1
PV PEAK D VEL: 0.45 M/S
PV PEAK S VEL: 0.45 M/S
RA MAJOR: 4.91 CM
RA WIDTH: 3.17 CM
RBC # BLD AUTO: 5.28 M/UL (ref 4.6–6.2)
RIGHT VENTRICULAR END-DIASTOLIC DIMENSION: 4.53 CM
RIGHT VENTRICULAR LENGTH IN DIASTOLE (APICAL 4-CHAMBER VIEW): 7.43 CM
RV MID DIAMA: 3.54 CM
RV TISSUE DOPPLER FREE WALL SYSTOLIC VELOCITY 1 (APICAL 4 CHAMBER VIEW): 0.02 CM/S
SINUS: 3.33 CM
SODIUM SERPL-SCNC: 140 MMOL/L (ref 136–145)
STJ: 3.2 CM
STRESS ECHO POST EXERCISE DUR MIN: 5 MINUTES
STRESS ECHO POST EXERCISE DUR SEC: 43 SECONDS
SYSTOLIC BLOOD PRESSURE: 127 MMHG
TDI LATERAL: 0.05 M/S
TDI SEPTAL: 0.05 M/S
TDI: 0.05 M/S
TR MAX PG: 26 MMHG
TRICUSPID ANNULAR PLANE SYSTOLIC EXCURSION: 2.43 CM
TRIGL SERPL-MCNC: 162 MG/DL (ref 30–150)
TSH SERPL DL<=0.005 MIU/L-ACNC: 1.23 UIU/ML (ref 0.4–4)
WBC # BLD AUTO: 6.59 K/UL (ref 3.9–12.7)

## 2023-02-13 PROCEDURE — 36415 COLL VENOUS BLD VENIPUNCTURE: CPT | Mod: PO | Performed by: INTERNAL MEDICINE

## 2023-02-13 PROCEDURE — 80061 LIPID PANEL: CPT | Performed by: INTERNAL MEDICINE

## 2023-02-13 PROCEDURE — 93320 DOPPLER ECHO COMPLETE: CPT | Mod: 26,,, | Performed by: INTERNAL MEDICINE

## 2023-02-13 PROCEDURE — 93351 STRESS ECHO (CUPID ONLY): ICD-10-PCS | Mod: 26,,, | Performed by: INTERNAL MEDICINE

## 2023-02-13 PROCEDURE — 93351 STRESS TTE COMPLETE: CPT | Mod: PO

## 2023-02-13 PROCEDURE — 93351 STRESS TTE COMPLETE: CPT | Mod: 26,,, | Performed by: INTERNAL MEDICINE

## 2023-02-13 PROCEDURE — 84443 ASSAY THYROID STIM HORMONE: CPT | Performed by: INTERNAL MEDICINE

## 2023-02-13 PROCEDURE — 80053 COMPREHEN METABOLIC PANEL: CPT | Performed by: INTERNAL MEDICINE

## 2023-02-13 PROCEDURE — 85025 COMPLETE CBC W/AUTO DIFF WBC: CPT | Performed by: INTERNAL MEDICINE

## 2023-02-13 PROCEDURE — 93325 PR DOPPLER COLOR FLOW VELOCITY MAP: ICD-10-PCS | Mod: 26,,, | Performed by: INTERNAL MEDICINE

## 2023-02-13 PROCEDURE — 93325 DOPPLER ECHO COLOR FLOW MAPG: CPT | Mod: 26,,, | Performed by: INTERNAL MEDICINE

## 2023-02-13 PROCEDURE — 93320 STRESS ECHO (CUPID ONLY): ICD-10-PCS | Mod: 26,,, | Performed by: INTERNAL MEDICINE

## 2023-02-17 DIAGNOSIS — I10 ESSENTIAL HYPERTENSION: Primary | ICD-10-CM

## 2023-02-24 ENCOUNTER — LAB VISIT (OUTPATIENT)
Dept: LAB | Facility: HOSPITAL | Age: 58
End: 2023-02-24
Attending: INTERNAL MEDICINE
Payer: OTHER GOVERNMENT

## 2023-02-24 DIAGNOSIS — I10 ESSENTIAL HYPERTENSION: ICD-10-CM

## 2023-02-24 LAB — POTASSIUM SERPL-SCNC: 4.4 MMOL/L (ref 3.5–5.1)

## 2023-02-24 PROCEDURE — 84132 ASSAY OF SERUM POTASSIUM: CPT | Performed by: INTERNAL MEDICINE

## 2023-02-24 PROCEDURE — 36415 COLL VENOUS BLD VENIPUNCTURE: CPT | Mod: PO | Performed by: INTERNAL MEDICINE

## 2023-05-01 DIAGNOSIS — M25.561 PAIN IN BOTH KNEES, UNSPECIFIED CHRONICITY: Primary | ICD-10-CM

## 2023-05-01 DIAGNOSIS — M25.562 PAIN IN BOTH KNEES, UNSPECIFIED CHRONICITY: Primary | ICD-10-CM

## 2023-05-02 ENCOUNTER — HOSPITAL ENCOUNTER (OUTPATIENT)
Dept: RADIOLOGY | Facility: HOSPITAL | Age: 58
Discharge: HOME | End: 2023-05-02
Attending: NURSE PRACTITIONER
Payer: COMMERCIAL

## 2023-05-02 ENCOUNTER — OFFICE VISIT (OUTPATIENT)
Dept: ORTHOPEDICS | Facility: CLINIC | Age: 58
End: 2023-05-02
Payer: COMMERCIAL

## 2023-05-02 VITALS — BODY MASS INDEX: 30.49 KG/M2 | WEIGHT: 212.94 LBS | HEIGHT: 70 IN

## 2023-05-02 DIAGNOSIS — M25.562 PAIN IN BOTH KNEES, UNSPECIFIED CHRONICITY: ICD-10-CM

## 2023-05-02 DIAGNOSIS — M17.0 BILATERAL PRIMARY OSTEOARTHRITIS OF KNEE: Primary | ICD-10-CM

## 2023-05-02 DIAGNOSIS — M25.561 PAIN IN BOTH KNEES, UNSPECIFIED CHRONICITY: ICD-10-CM

## 2023-05-02 PROCEDURE — 73562 X-RAY EXAM OF KNEE 3: CPT | Mod: TC,50,PO

## 2023-05-02 PROCEDURE — 73562 XR KNEE ORTHO BILAT: ICD-10-PCS | Mod: 26,,, | Performed by: RADIOLOGY

## 2023-05-02 PROCEDURE — 73562 X-RAY EXAM OF KNEE 3: CPT | Mod: 26,,, | Performed by: RADIOLOGY

## 2023-05-02 PROCEDURE — 3008F BODY MASS INDEX DOCD: CPT | Mod: CPTII,S$GLB,, | Performed by: NURSE PRACTITIONER

## 2023-05-02 PROCEDURE — 1159F PR MEDICATION LIST DOCUMENTED IN MEDICAL RECORD: ICD-10-PCS | Mod: CPTII,S$GLB,, | Performed by: NURSE PRACTITIONER

## 2023-05-02 PROCEDURE — 99203 OFFICE O/P NEW LOW 30 MIN: CPT | Mod: 25,S$GLB,, | Performed by: NURSE PRACTITIONER

## 2023-05-02 PROCEDURE — 99999 PR PBB SHADOW E&M-EST. PATIENT-LVL III: ICD-10-PCS | Mod: PBBFAC,,, | Performed by: NURSE PRACTITIONER

## 2023-05-02 PROCEDURE — 99999 PR PBB SHADOW E&M-EST. PATIENT-LVL III: CPT | Mod: PBBFAC,,, | Performed by: NURSE PRACTITIONER

## 2023-05-02 PROCEDURE — 3008F PR BODY MASS INDEX (BMI) DOCUMENTED: ICD-10-PCS | Mod: CPTII,S$GLB,, | Performed by: NURSE PRACTITIONER

## 2023-05-02 PROCEDURE — 20610 DRAIN/INJ JOINT/BURSA W/O US: CPT | Mod: 50,S$GLB,, | Performed by: NURSE PRACTITIONER

## 2023-05-02 PROCEDURE — 20610 LARGE JOINT ASPIRATION/INJECTION: BILATERAL KNEE: ICD-10-PCS | Mod: 50,S$GLB,, | Performed by: NURSE PRACTITIONER

## 2023-05-02 PROCEDURE — 1160F PR REVIEW ALL MEDS BY PRESCRIBER/CLIN PHARMACIST DOCUMENTED: ICD-10-PCS | Mod: CPTII,S$GLB,, | Performed by: NURSE PRACTITIONER

## 2023-05-02 PROCEDURE — 1160F RVW MEDS BY RX/DR IN RCRD: CPT | Mod: CPTII,S$GLB,, | Performed by: NURSE PRACTITIONER

## 2023-05-02 PROCEDURE — 99203 PR OFFICE/OUTPT VISIT, NEW, LEVL III, 30-44 MIN: ICD-10-PCS | Mod: 25,S$GLB,, | Performed by: NURSE PRACTITIONER

## 2023-05-02 PROCEDURE — 1159F MED LIST DOCD IN RCRD: CPT | Mod: CPTII,S$GLB,, | Performed by: NURSE PRACTITIONER

## 2023-05-02 RX ORDER — TRIAMCINOLONE ACETONIDE 40 MG/ML
40 INJECTION, SUSPENSION INTRA-ARTICULAR; INTRAMUSCULAR
Status: DISCONTINUED | OUTPATIENT
Start: 2023-05-02 | End: 2023-05-02 | Stop reason: HOSPADM

## 2023-05-02 RX ADMIN — TRIAMCINOLONE ACETONIDE 40 MG: 40 INJECTION, SUSPENSION INTRA-ARTICULAR; INTRAMUSCULAR at 08:05

## 2023-05-02 NOTE — PROCEDURES
Large Joint Aspiration/Injection: bilateral knee    Date/Time: 5/2/2023 8:20 AM  Performed by: RONALD Townsend  Authorized by: RONALD Townsend     Consent Done?:  Yes (Verbal)  Indications:  Pain  Timeout: prior to procedure the correct patient, procedure, and site was verified    Prep: patient was prepped and draped in usual sterile fashion    Local anesthetic:  Lidocaine 1% without epinephrine  Anesthetic total (ml):  5      Details:  Needle Size:  21 G  Approach:  Anterolateral  Location:  Knee  Laterality:  Bilateral  Site:  Bilateral knee  Medications (Right):  40 mg triamcinolone acetonide 40 mg/mL  Medications (Left):  40 mg triamcinolone acetonide 40 mg/mL  Patient tolerance:  Patient tolerated the procedure well with no immediate complications

## 2023-05-02 NOTE — PROGRESS NOTES
Chief Complaint   Patient presents with    Right Knee - Pain    Left Knee - Pain         HPI:   This is a 58 y.o. male who presents to clinic today complaining of bilateral knee pain for several years (3-4); [facility] the last 3 years about every 6 months but reports they are lasting about 5 months. He has also done P.T. for this. Bilateral knee pain started after no known trauma. Pain is progressively worsening and he notices that it is affecting his quality of life and ability to walk. No numbness or tingling.     Past Medical History:   Diagnosis Date    Allergy     Hyperlipemia     Hypertension      Past Surgical History:   Procedure Laterality Date    COLONOSCOPY  02/22/2019    Dr Lofton, received report     EPIDURAL STEROID INJECTION INTO LUMBAR SPINE N/A 9/28/2022    Procedure: Injection-steroid-epidural-lumbar L5/S1 to right;  Surgeon: Benny Pritchard MD;  Location: [facility];  Service: Pain Management;  Laterality: N/A;    HERNIA REPAIR       Current Outpatient Medications on File Prior to Visit   Medication Sig Dispense Refill    amLODIPine (NORVASC) 5 MG tablet Take 1 tablet (5 mg total) by mouth once daily. 90 tablet 3    atorvastatin (LIPITOR) 20 MG tablet Take 1 tablet (20 mg total) by mouth once daily. 90 tablet 3    carvediloL (COREG) 3.125 MG tablet TAKE 1 TABLET BY MOUTH TWICE DAILY WITH MEALS 180 tablet 0    diclofenac sodium (VOLTAREN) 1 % Gel APPLY 4 GRAMS TOPICALLY FOUR TIMES A DAY AS NEEDED FOR PAIN AND INFLAMMATION. USE ENCLOSED DOSING CARD. BILATERAL KNEE PAIN      FLUoxetine 40 MG capsule Take 1 capsule (40 mg total) by mouth once daily. 90 capsule 3     No current facility-administered medications on file prior to visit.     Review of patient's allergies indicates:  No Known Allergies  Family History   Problem Relation Age of Onset    Hypertension Mother     Diabetes Father      Social History     Socioeconomic History    Marital status:     Tobacco Use    Smoking status: Never    Smokeless tobacco: Never   Substance and Sexual Activity    Alcohol use: Yes     Alcohol/week: 0.0 standard drinks     Comment: occasional    Drug use: No    Sexual activity: Yes     Partners: Female     Social Determinants of Health     Financial Resource Strain: Low Risk     Difficulty of Paying Living Expenses: Not hard at all   Food Insecurity: No Food Insecurity    Worried About Running Out of Food in the Last Year: Never true    Ran Out of Food in the Last Year: Never true   Transportation Needs: No Transportation Needs    Lack of Transportation (Medical): No    Lack of Transportation (Non-Medical): No   Physical Activity: Inactive    Days of Exercise per Week: 0 days    Minutes of Exercise per Session: 0 min   Stress: No Stress Concern Present    Feeling of Stress : Only a little   Social Connections: Unknown    Frequency of Communication with Friends and Family: Once a week    Frequency of Social Gatherings with Friends and Family: Once a week    Active Member of Clubs or Organizations: No    Attends Club or Organization Meetings: Never    Marital Status:    Housing Stability: Low Risk     Unable to Pay for Housing in the Last Year: No    Number of Places Lived in the Last Year: 1    Unstable Housing in the Last Year: No       Review of Systems:  Constitutional:  Denies fever or chills   Eyes:  Denies change in visual acuity   HENT:  Denies nasal congestion or sore throat   Respiratory:  Denies cough or shortness of breath   Cardiovascular:  Denies chest pain or edema   GI:  Denies abdominal pain, nausea, vomiting, bloody stools or diarrhea   :  Denies dysuria   Integument:  Denies rash   Neurologic:  Denies headache, focal weakness or sensory changes   Endocrine:  Denies polyuria or polydipsia   Lymphatic:  Denies swollen glands   Psychiatric:  Denies depression or anxiety     Physical Exam:   Constitutional:  Well developed, well nourished, no  acute distress, non-toxic appearance   Integument:  Well hydrated  Neurologic:  Alert & oriented x 3  Psychiatric:  Speech and behavior appropriate     Bilateral Knee Exam    Bilateral Knee Exam     Tenderness   The patient is experiencing tenderness in the medial joint line.    Range of Motion   Extension: abnormal   Flexion: abnormal     Muscle Strength     The patient has normal knee strength.    Tests   Angelina:  Medial - positive   Lachman:  Anterior - negative      Varus: negative  Valgus: negative  Patellar Apprehension: negative    Other   Erythema: absent  Sensation: normal  Pulse: present  Swelling: mild      Bilateral Knee Exam   Bilateral knee exam performed same as contralateral side and is normal.            X-rays were performed, personally reviewed by me and findings discussed with the patient.  3 views of the bilateral knee show tricompartmental degenerative change most pronounced in the medial compartment with Kellgren 3-4 changes.               Bilateral primary osteoarthritis of knee  -     Large Joint Aspiration/Injection: bilateral knee      Using an aseptic technique, I injected 5 cc of lidocaine 1% without and 1 cc of kenalog 40mg into the bilateral Knee. The patient tolerated this well.     Rtc in 4 months with MD Stephen to discuss TKA bilaterally.            Answers submitted by the patient for this visit:  New Patient on 5/2/2023  8:20 AM with Tawny Marie  Orthopedics Questionnaire (Submitted on 5/1/2023)  unexpected weight change: No  appetite change : No  sleep disturbance: No  IMMUNOCOMPROMISED: No  nervous/ anxious: No  dysphoric mood: No  rash: No  visual disturbance: No  eye redness: No  eye pain: No  ear pain: No  tinnitus: Yes  hearing loss: Yes  sinus pressure : No  nosebleeds: No  enviro allergies: Yes  food allergies: No  cough: No  shortness of breath: No  sweating: No  frequency: No  difficulty urinating: No  hematuria: No  chest pain: No  palpitations: No  nausea:  No  vomiting: No  diarrhea: No  blood in stool: No  constipation: No  headaches: No  dizziness: No  numbness: No  seizures: No  joint swelling: No  myalgia: No  weakness: No  back pain: Yes

## 2023-05-18 ENCOUNTER — OFFICE VISIT (OUTPATIENT)
Dept: CARDIOLOGY | Facility: CLINIC | Age: 58
End: 2023-05-18
Payer: OTHER GOVERNMENT

## 2023-05-18 VITALS
BODY MASS INDEX: 32.42 KG/M2 | SYSTOLIC BLOOD PRESSURE: 138 MMHG | HEIGHT: 70 IN | HEART RATE: 76 BPM | WEIGHT: 226.44 LBS | DIASTOLIC BLOOD PRESSURE: 86 MMHG

## 2023-05-18 DIAGNOSIS — I77.810 ASCENDING AORTA DILATATION: ICD-10-CM

## 2023-05-18 DIAGNOSIS — I10 ESSENTIAL HYPERTENSION: Chronic | ICD-10-CM

## 2023-05-18 DIAGNOSIS — E78.00 PURE HYPERCHOLESTEROLEMIA: Chronic | ICD-10-CM

## 2023-05-18 DIAGNOSIS — I34.0 NONRHEUMATIC MITRAL VALVE REGURGITATION: Primary | Chronic | ICD-10-CM

## 2023-05-18 DIAGNOSIS — E66.9 OBESITY, CLASS I, BMI 30-34.9: Chronic | ICD-10-CM

## 2023-05-18 PROCEDURE — 99214 PR OFFICE/OUTPT VISIT, EST, LEVL IV, 30-39 MIN: ICD-10-PCS | Mod: S$GLB,,, | Performed by: INTERNAL MEDICINE

## 2023-05-18 PROCEDURE — 99999 PR PBB SHADOW E&M-EST. PATIENT-LVL III: ICD-10-PCS | Mod: PBBFAC,,, | Performed by: INTERNAL MEDICINE

## 2023-05-18 PROCEDURE — 99213 OFFICE O/P EST LOW 20 MIN: CPT | Mod: PBBFAC,PO | Performed by: INTERNAL MEDICINE

## 2023-05-18 PROCEDURE — 99214 OFFICE O/P EST MOD 30 MIN: CPT | Mod: S$GLB,,, | Performed by: INTERNAL MEDICINE

## 2023-05-18 PROCEDURE — 99999 PR PBB SHADOW E&M-EST. PATIENT-LVL III: CPT | Mod: PBBFAC,,, | Performed by: INTERNAL MEDICINE

## 2023-05-18 RX ORDER — CARVEDILOL 6.25 MG/1
6.25 TABLET ORAL 2 TIMES DAILY WITH MEALS
Qty: 180 TABLET | Refills: 1 | Status: SHIPPED | OUTPATIENT
Start: 2023-05-18 | End: 2023-11-06

## 2023-05-18 NOTE — PROGRESS NOTES
Subjective:    Patient ID:  Bob Farmer is a 58 y.o. male who presents for Nonrheumatic mitral valve regurgitation        HPI  DISCUSSED TESTS LABS AND GOALS CMP POTASSIUM 5.7 ON REPEAT WAS NORMAL, , HDL 43, TRIGLYCERIDE 162, TSH AND CBC NORMAL, NEGATIVE SE, MILD MR, DILATED AORTA, SEE ROS    Past Medical History:   Diagnosis Date    Allergy     Hyperlipemia     Hypertension      Past Surgical History:   Procedure Laterality Date    COLONOSCOPY  02/22/2019    Dr Lofton, received report     EPIDURAL STEROID INJECTION INTO LUMBAR SPINE N/A 9/28/2022    Procedure: Injection-steroid-epidural-lumbar L5/S1 to right;  Surgeon: Benny Pritchard MD;  Location: [facility] OR;  Service: Pain Management;  Laterality: N/A;    HERNIA REPAIR       Family History   Problem Relation Age of Onset    Hypertension Mother     Diabetes Father      Social History     Socioeconomic History    Marital status:    Tobacco Use    Smoking status: Never    Smokeless tobacco: Never   Substance and Sexual Activity    Alcohol use: Yes     Alcohol/week: 0.0 standard drinks     Comment: occasional    Drug use: No    Sexual activity: Yes     Partners: Female     Social Determinants of Health     Financial Resource Strain: Low Risk     Difficulty of Paying Living Expenses: Not hard at all   Food Insecurity: No Food Insecurity    Worried About Running Out of Food in the Last Year: Never true    Ran Out of Food in the Last Year: Never true   Transportation Needs: No Transportation Needs    Lack of Transportation (Medical): No    Lack of Transportation (Non-Medical): No   Physical Activity: Inactive    Days of Exercise per Week: 0 days    Minutes of Exercise per Session: 0 min   Stress: No Stress Concern Present    Feeling of Stress : Only a little   Social Connections: Unknown    Frequency of Communication with Friends and Family: Never    Frequency of Social Gatherings with Friends and Family: Once a week    Active Member of Clubs or  Organizations: No    Attends Club or Organization Meetings: Never    Marital Status:    Housing Stability: Low Risk     Unable to Pay for Housing in the Last Year: No    Number of Places Lived in the Last Year: 1    Unstable Housing in the Last Year: No       Review of patient's allergies indicates:  No Known Allergies    Current Outpatient Medications:     amLODIPine (NORVASC) 5 MG tablet, Take 1 tablet (5 mg total) by mouth once daily., Disp: 90 tablet, Rfl: 3    atorvastatin (LIPITOR) 20 MG tablet, Take 1 tablet (20 mg total) by mouth once daily., Disp: 90 tablet, Rfl: 3    diclofenac sodium (VOLTAREN) 1 % Gel, APPLY 4 GRAMS TOPICALLY FOUR TIMES A DAY AS NEEDED FOR PAIN AND INFLAMMATION. USE ENCLOSED DOSING CARD. BILATERAL KNEE PAIN, Disp: , Rfl:     FLUoxetine 40 MG capsule, Take 1 capsule (40 mg total) by mouth once daily., Disp: 90 capsule, Rfl: 3    carvediloL (COREG) 6.25 MG tablet, Take 1 tablet (6.25 mg total) by mouth 2 (two) times daily with meals., Disp: 180 tablet, Rfl: 1    Review of Systems   Constitutional: Negative for chills, diaphoresis, fever, malaise/fatigue and night sweats.   HENT:  Negative for congestion and sore throat.    Eyes:  Negative for blurred vision and visual disturbance.   Cardiovascular:  Negative for chest pain, claudication, cyanosis, dyspnea on exertion (MILD,), irregular heartbeat, leg swelling, near-syncope, orthopnea, palpitations, paroxysmal nocturnal dyspnea and syncope.   Respiratory:  Negative for cough, hemoptysis, shortness of breath and wheezing.    Endocrine: Negative.    Hematologic/Lymphatic: Negative for adenopathy. Does not bruise/bleed easily.   Skin:  Negative for color change and itching.   Musculoskeletal:  Positive for joint pain (KNEES). Negative for back pain and falls.   Gastrointestinal:  Negative for abdominal pain, change in bowel habit, dysphagia, jaundice, melena and nausea.   Genitourinary:  Negative for dysuria and flank pain.  "  Neurological:  Negative for brief paralysis, dizziness, focal weakness, loss of balance, numbness and weakness.   Psychiatric/Behavioral:  Negative for altered mental status and depression.       Objective:      Vitals:    05/18/23 1514   BP: 138/86   Pulse: 76   Weight: 102.7 kg (226 lb 6.6 oz)   Height: 5' 10" (1.778 m)   PainSc: 0-No pain     Body mass index is 32.49 kg/m².    Physical Exam  Constitutional:       Appearance: Normal appearance. He is well-developed. He is obese.   HENT:      Head: Normocephalic and atraumatic.   Eyes:      Extraocular Movements: Extraocular movements intact.      Conjunctiva/sclera: Conjunctivae normal.      Pupils: Pupils are equal, round, and reactive to light.   Neck:      Vascular: No carotid bruit or JVD.   Cardiovascular:      Rate and Rhythm: Normal rate and regular rhythm. No extrasystoles are present.     Pulses:           Carotid pulses are 2+ on the right side and 2+ on the left side.       Radial pulses are 2+ on the right side and 2+ on the left side.        Femoral pulses are 2+ on the right side and 2+ on the left side.       Posterior tibial pulses are 2+ on the right side and 2+ on the left side.      Heart sounds: Murmur heard.   Systolic murmur is present with a grade of 1/6 at the lower left sternal border and apex.     No friction rub. No gallop.   Pulmonary:      Effort: Pulmonary effort is normal.      Breath sounds: Normal breath sounds and air entry. No rales.   Abdominal:      Palpations: Abdomen is soft. There is no hepatomegaly.      Tenderness: There is no abdominal tenderness.      Hernia: A hernia is present. There is no hernia in the umbilical area.   Musculoskeletal:         General: No deformity. Normal range of motion.      Cervical back: Neck supple.      Comments: NO ANKLE EDEMA, VARICOSE VEINS   Skin:     General: Skin is warm and dry.      Capillary Refill: Capillary refill takes less than 2 seconds.   Neurological:      General: No focal " deficit present.      Mental Status: He is alert and oriented to person, place, and time.      Cranial Nerves: Cranial nerves 2-12 are intact.   Psychiatric:         Mood and Affect: Mood normal.         Speech: Speech normal.         Behavior: Behavior normal.             ..    Chemistry        Component Value Date/Time     02/13/2023 0853     08/01/2015 0730    K 4.4 02/24/2023 1340    K 4.9 08/01/2015 0730     02/13/2023 0853     08/01/2015 0730    CO2 23 02/13/2023 0853    BUN 19 02/13/2023 0853    CREATININE 1.0 02/13/2023 0853    CREATININE 1.01 08/01/2015 0730    GLU 97 02/13/2023 0853        Component Value Date/Time    CALCIUM 9.4 02/13/2023 0853    ALKPHOS 118 02/13/2023 0853    AST 22 02/13/2023 0853    ALT 30 02/13/2023 0853    BILITOT 0.6 02/13/2023 0853    ESTGFRAFRICA >60 02/02/2021 1155    EGFRNONAA >60 02/02/2021 1155            ..  Lab Results   Component Value Date    CHOL 193 02/13/2023    CHOL 184 08/14/2020    CHOL 201 (H) 08/10/2019     Lab Results   Component Value Date    HDL 43 02/13/2023    HDL 47 08/14/2020    HDL 49 08/10/2019     Lab Results   Component Value Date    LDLCALC 117.6 02/13/2023    LDLCALC 116 (H) 08/14/2020    LDLCALC 132 (H) 08/10/2019     Lab Results   Component Value Date    TRIG 162 (H) 02/13/2023    TRIG 105 08/14/2020    TRIG 98 08/10/2019     Lab Results   Component Value Date    CHOLHDL 22.3 02/13/2023    CHOLHDL 4.1 08/10/2019    CHOLHDL 4.0 02/02/2019     ..  Lab Results   Component Value Date    WBC 6.59 02/13/2023    HGB 16.3 02/13/2023    HCT 47.7 02/13/2023    MCV 90 02/13/2023     02/13/2023       Test(s) Reviewed  I have reviewed the following in detail:  [] Stress test   [] Angiography   [] Echocardiogram   [x] Labs   [] Other:       Assessment:         ICD-10-CM ICD-9-CM   1. Nonrheumatic mitral valve regurgitation  I34.0 424.0   2. Ascending aorta dilatation  I77.810 447.71   3. Essential hypertension  I10 401.9   4. Pure  hypercholesterolemia  E78.00 272.0   5. Obesity, Class I, BMI 30-34.9  E66.9 278.00     Problem List Items Addressed This Visit          Cardiac/Vascular    Essential hypertension    Nonrheumatic mitral valve regurgitation - Primary    Ascending aorta dilatation    Hyperlipidemia       Endocrine    Obesity, Class I, BMI 30-34.9        Plan:         INCREASE CARVEDILOL TO 6.25 BID,WATCH BP, ALL CV CLINICALLY STABLE, NO ANGINA, NO HF, NO TIA, NO CLINICAL ARRHYTHMIA,CONTINUE CURRENT MEDS, EDUCATION, DIET, EXERCISE , WEIGHT LOSS, RTC IN 6 MO  Nonrheumatic mitral valve regurgitation    Ascending aorta dilatation  Comments:  BB    Essential hypertension    Pure hypercholesterolemia    Obesity, Class I, BMI 30-34.9    Other orders  -     carvediloL (COREG) 6.25 MG tablet; Take 1 tablet (6.25 mg total) by mouth 2 (two) times daily with meals.  Dispense: 180 tablet; Refill: 1    RTC Low level/low impact aerobic exercise 5x's/wk. Heart healthy diet and risk factor modification.    See labs and med orders.    Aerobic exercise 5x's/wk. Heart healthy diet and risk factor modification.    See labs and med orders.

## 2023-06-27 ENCOUNTER — OFFICE VISIT (OUTPATIENT)
Dept: ORTHOPEDICS | Facility: CLINIC | Age: 58
End: 2023-06-27
Payer: COMMERCIAL

## 2023-06-27 VITALS — WEIGHT: 226 LBS | BODY MASS INDEX: 32.35 KG/M2 | HEIGHT: 70 IN

## 2023-06-27 DIAGNOSIS — M17.11 PRIMARY OSTEOARTHRITIS OF RIGHT KNEE: Primary | ICD-10-CM

## 2023-06-27 DIAGNOSIS — Z01.818 PREOP EXAMINATION: ICD-10-CM

## 2023-06-27 PROCEDURE — 3008F PR BODY MASS INDEX (BMI) DOCUMENTED: ICD-10-PCS | Mod: CPTII,S$GLB,, | Performed by: ORTHOPAEDIC SURGERY

## 2023-06-27 PROCEDURE — 99204 PR OFFICE/OUTPT VISIT, NEW, LEVL IV, 45-59 MIN: ICD-10-PCS | Mod: S$GLB,,, | Performed by: ORTHOPAEDIC SURGERY

## 2023-06-27 PROCEDURE — 1159F PR MEDICATION LIST DOCUMENTED IN MEDICAL RECORD: ICD-10-PCS | Mod: CPTII,S$GLB,, | Performed by: ORTHOPAEDIC SURGERY

## 2023-06-27 PROCEDURE — 99999 PR PBB SHADOW E&M-EST. PATIENT-LVL III: CPT | Mod: PBBFAC,,, | Performed by: ORTHOPAEDIC SURGERY

## 2023-06-27 PROCEDURE — 3008F BODY MASS INDEX DOCD: CPT | Mod: CPTII,S$GLB,, | Performed by: ORTHOPAEDIC SURGERY

## 2023-06-27 PROCEDURE — 1160F RVW MEDS BY RX/DR IN RCRD: CPT | Mod: CPTII,S$GLB,, | Performed by: ORTHOPAEDIC SURGERY

## 2023-06-27 PROCEDURE — 99204 OFFICE O/P NEW MOD 45 MIN: CPT | Mod: S$GLB,,, | Performed by: ORTHOPAEDIC SURGERY

## 2023-06-27 PROCEDURE — 99999 PR PBB SHADOW E&M-EST. PATIENT-LVL III: ICD-10-PCS | Mod: PBBFAC,,, | Performed by: ORTHOPAEDIC SURGERY

## 2023-06-27 PROCEDURE — 1160F PR REVIEW ALL MEDS BY PRESCRIBER/CLIN PHARMACIST DOCUMENTED: ICD-10-PCS | Mod: CPTII,S$GLB,, | Performed by: ORTHOPAEDIC SURGERY

## 2023-06-27 PROCEDURE — 1159F MED LIST DOCD IN RCRD: CPT | Mod: CPTII,S$GLB,, | Performed by: ORTHOPAEDIC SURGERY

## 2023-06-27 NOTE — H&P
Chief Complaint   Patient presents with    Right Knee - Pain    Left Knee - Pain         HPI:   This is a 58 y.o. who presents to clinic today complaining of right knee pain for 2 years after no known trauma. Pain is progressively worsening. No numbness or tingling. No associated signs or symptoms.    Past Medical History:   Diagnosis Date    Allergy     Hyperlipemia     Hypertension      Past Surgical History:   Procedure Laterality Date    COLONOSCOPY  02/22/2019    Dr Lofton, received report     EPIDURAL STEROID INJECTION INTO LUMBAR SPINE N/A 9/28/2022    Procedure: Injection-steroid-epidural-lumbar L5/S1 to right;  Surgeon: Benny Pritchard MD;  Location: [facility] OR;  Service: Pain Management;  Laterality: N/A;    HERNIA REPAIR       Current Outpatient Medications on File Prior to Visit   Medication Sig Dispense Refill    amLODIPine (NORVASC) 5 MG tablet Take 1 tablet (5 mg total) by mouth once daily. 90 tablet 3    atorvastatin (LIPITOR) 20 MG tablet Take 1 tablet (20 mg total) by mouth once daily. 90 tablet 3    carvediloL (COREG) 6.25 MG tablet Take 1 tablet (6.25 mg total) by mouth 2 (two) times daily with meals. 180 tablet 1    diclofenac sodium (VOLTAREN) 1 % Gel APPLY 4 GRAMS TOPICALLY FOUR TIMES A DAY AS NEEDED FOR PAIN AND INFLAMMATION. USE ENCLOSED DOSING CARD. BILATERAL KNEE PAIN      FLUoxetine 40 MG capsule Take 1 capsule (40 mg total) by mouth once daily. 90 capsule 3     No current facility-administered medications on file prior to visit.     Review of patient's allergies indicates:  No Known Allergies  Family History   Problem Relation Age of Onset    Hypertension Mother     Diabetes Father      Social History     Socioeconomic History    Marital status:    Tobacco Use    Smoking status: Never    Smokeless tobacco: Never   Substance and Sexual Activity    Alcohol use: Yes     Alcohol/week: 0.0 standard drinks     Comment: occasional    Drug use: No    Sexual activity: Yes     Partners:  Female     Social Determinants of Health     Financial Resource Strain: Low Risk     Difficulty of Paying Living Expenses: Not hard at all   Food Insecurity: No Food Insecurity    Worried About Running Out of Food in the Last Year: Never true    Ran Out of Food in the Last Year: Never true   Transportation Needs: No Transportation Needs    Lack of Transportation (Medical): No    Lack of Transportation (Non-Medical): No   Physical Activity: Inactive    Days of Exercise per Week: 0 days    Minutes of Exercise per Session: 0 min   Stress: No Stress Concern Present    Feeling of Stress : Only a little   Social Connections: Unknown    Frequency of Communication with Friends and Family: Never    Frequency of Social Gatherings with Friends and Family: Once a week    Active Member of Clubs or Organizations: No    Attends Club or Organization Meetings: Never    Marital Status:    Housing Stability: Low Risk     Unable to Pay for Housing in the Last Year: No    Number of Places Lived in the Last Year: 1    Unstable Housing in the Last Year: No       Review of Systems:  Constitutional:  Denies fever or chills   Eyes:  Denies change in visual acuity   HENT:  Denies nasal congestion or sore throat   Respiratory:  Denies cough or shortness of breath   Cardiovascular:  Denies chest pain or edema   GI:  Denies abdominal pain, nausea, vomiting, bloody stools or diarrhea   :  Denies dysuria   Integument:  Denies rash   Neurologic:  Denies headache, focal weakness or sensory changes   Endocrine:  Denies polyuria or polydipsia   Lymphatic:  Denies swollen glands   Psychiatric:  Denies depression or anxiety     Physical Exam:   Constitutional:  Well developed, well nourished, no acute distress, non-toxic appearance   Integument:  Well hydrated, no rash   Lymphatic:  No lymphadenopathy noted   Neurologic:  Alert & oriented x 3, CN 2-12 normal, normal motor function, normal sensory function, no focal deficits noted   Psychiatric:   Speech and behavior appropriate   Eyes: EOMI  Gi: abdomen soft    Bilateral Knee Exam    right Knee Exam     Tenderness   The patient is experiencing tenderness in the medial joint line.    Range of Motion   Extension: abnormal   Flexion: abnormal     Muscle Strength     The patient has normal knee strength.    Tests   Angelina:  Medial - positive   Lachman:  Anterior - negative      Varus: negative  Valgus: negative  Patellar Apprehension: negative    Other   Erythema: absent  Sensation: normal  Pulse: present  Swelling: mild      left Knee Exam   left knee exam performed same as contralateral side and is normal.            X-rays were performed, personally reviewed by me and findings discussed with the patient.  3 views of the right knee show tricompartmental degenerative change most pronounced in the medial compartment with Kellgren 3 changes    Primary osteoarthritis of right knee            I had a long discussion with the patient regarding the benefits and risks of right TKA. They voiced understanding and wish to proceed with surgery. Consents signed in clinic.

## 2023-07-10 ENCOUNTER — PATIENT MESSAGE (OUTPATIENT)
Dept: CARDIOLOGY | Facility: CLINIC | Age: 58
End: 2023-07-10
Payer: COMMERCIAL

## 2023-07-13 ENCOUNTER — TELEPHONE (OUTPATIENT)
Dept: CARDIOLOGY | Facility: CLINIC | Age: 58
End: 2023-07-13
Payer: COMMERCIAL

## 2023-07-13 NOTE — TELEPHONE ENCOUNTER
Pt needs a cardiac clearance for RIGHT TOTAL KNEE ARTHROPLASTY. Please advise.    [facility]   Dr. Mitchell  Fax [phone]  Phone [phone]

## 2023-07-13 NOTE — TELEPHONE ENCOUNTER
----- Message from Janna Peterson sent at 7/13/2023  9:55 AM CDT -----  Please advise if patient is cleared for surgery. Thanks, Janna

## 2023-07-14 RX ORDER — SODIUM CHLORIDE 0.9 % (FLUSH) 0.9 %
10 SYRINGE (ML) INJECTION
Status: DISCONTINUED | OUTPATIENT
Start: 2023-07-14 | End: 2024-01-16

## 2023-07-24 PROBLEM — M17.11 PRIMARY OSTEOARTHRITIS OF RIGHT KNEE: Status: ACTIVE | Noted: 2023-07-24

## 2023-08-04 DIAGNOSIS — Z96.651 S/P TOTAL KNEE ARTHROPLASTY, RIGHT: Primary | ICD-10-CM

## 2023-08-08 ENCOUNTER — OFFICE VISIT (OUTPATIENT)
Dept: ORTHOPEDICS | Facility: CLINIC | Age: 58
End: 2023-08-08
Payer: COMMERCIAL

## 2023-08-08 ENCOUNTER — HOSPITAL ENCOUNTER (OUTPATIENT)
Dept: RADIOLOGY | Facility: HOSPITAL | Age: 58
Discharge: HOME | End: 2023-08-08
Attending: ORTHOPAEDIC SURGERY
Payer: COMMERCIAL

## 2023-08-08 DIAGNOSIS — Z96.651 S/P TOTAL KNEE ARTHROPLASTY, RIGHT: ICD-10-CM

## 2023-08-08 DIAGNOSIS — Z96.651 S/P TOTAL KNEE ARTHROPLASTY, RIGHT: Primary | ICD-10-CM

## 2023-08-08 PROCEDURE — 1159F MED LIST DOCD IN RCRD: CPT | Mod: CPTII,S$GLB,, | Performed by: ORTHOPAEDIC SURGERY

## 2023-08-08 PROCEDURE — 1160F RVW MEDS BY RX/DR IN RCRD: CPT | Mod: CPTII,S$GLB,, | Performed by: ORTHOPAEDIC SURGERY

## 2023-08-08 PROCEDURE — 99999 PR PBB SHADOW E&M-EST. PATIENT-LVL II: CPT | Mod: PBBFAC,,, | Performed by: ORTHOPAEDIC SURGERY

## 2023-08-08 PROCEDURE — 99024 POSTOP FOLLOW-UP VISIT: CPT | Mod: S$GLB,,, | Performed by: ORTHOPAEDIC SURGERY

## 2023-08-08 PROCEDURE — 1160F PR REVIEW ALL MEDS BY PRESCRIBER/CLIN PHARMACIST DOCUMENTED: ICD-10-PCS | Mod: CPTII,S$GLB,, | Performed by: ORTHOPAEDIC SURGERY

## 2023-08-08 PROCEDURE — 99024 PR POST-OP FOLLOW-UP VISIT: ICD-10-PCS | Mod: S$GLB,,, | Performed by: ORTHOPAEDIC SURGERY

## 2023-08-08 PROCEDURE — 73562 XR KNEE ORTHO RIGHT: ICD-10-PCS | Mod: 26,RT,, | Performed by: RADIOLOGY

## 2023-08-08 PROCEDURE — 73562 X-RAY EXAM OF KNEE 3: CPT | Mod: 26,RT,, | Performed by: RADIOLOGY

## 2023-08-08 PROCEDURE — 73560 X-RAY EXAM OF KNEE 1 OR 2: CPT | Mod: 59,TC,PO,LT

## 2023-08-08 PROCEDURE — 1159F PR MEDICATION LIST DOCUMENTED IN MEDICAL RECORD: ICD-10-PCS | Mod: CPTII,S$GLB,, | Performed by: ORTHOPAEDIC SURGERY

## 2023-08-08 PROCEDURE — 99999 PR PBB SHADOW E&M-EST. PATIENT-LVL II: ICD-10-PCS | Mod: PBBFAC,,, | Performed by: ORTHOPAEDIC SURGERY

## 2023-08-08 PROCEDURE — 73560 XR KNEE ORTHO RIGHT: ICD-10-PCS | Mod: 26,LT,, | Performed by: RADIOLOGY

## 2023-08-08 PROCEDURE — 73560 X-RAY EXAM OF KNEE 1 OR 2: CPT | Mod: 26,LT,, | Performed by: RADIOLOGY

## 2023-08-08 RX ORDER — MELOXICAM 15 MG/1
15 TABLET ORAL DAILY
Qty: 30 TABLET | Refills: 11 | Status: SHIPPED | OUTPATIENT
Start: 2023-08-08 | End: 2023-11-02

## 2023-08-08 RX ORDER — METHOCARBAMOL 750 MG/1
750 TABLET, FILM COATED ORAL 4 TIMES DAILY PRN
Qty: 44 TABLET | Refills: 3 | Status: SHIPPED | OUTPATIENT
Start: 2023-08-08 | End: 2023-11-02

## 2023-08-08 NOTE — PROGRESS NOTES
No chief complaint on file.      HPI:  58 y.o. male returns to clinic today status post right total knee arthroplasty 2 weeks ago. Pain is minimal. Patient is compliant most of the time with restrictions.     right knee: ROM 0-105. Overall normal alignment. Incision healed. No erythema or fluctuance. Stable to stress. Skin intact. Compartments soft. NVI distally.     X-rays were performed today, personally reviewed by me and findings discussed with the patient.  3 views of the right knee show implants intact in good position    S/P total knee arthroplasty, right    Other orders  -     methocarbamoL (ROBAXIN) 750 MG Tab; Take 1 tablet (750 mg total) by mouth 4 (four) times daily as needed.  Dispense: 44 tablet; Refill: 3  -     meloxicam (MOBIC) 15 MG tablet; Take 1 tablet (15 mg total) by mouth once daily.  Dispense: 30 tablet; Refill: 11        Begin outpatient PT. RTC 6 weeks.

## 2023-08-11 ENCOUNTER — PATIENT MESSAGE (OUTPATIENT)
Dept: ORTHOPEDICS | Facility: CLINIC | Age: 58
End: 2023-08-11
Payer: COMMERCIAL

## 2023-08-11 ENCOUNTER — TELEPHONE (OUTPATIENT)
Dept: ORTHOPEDICS | Facility: CLINIC | Age: 58
End: 2023-08-11
Payer: COMMERCIAL

## 2023-08-11 NOTE — TELEPHONE ENCOUNTER
----- Message from Long Marquez sent at 8/11/2023 10:49 AM CDT -----  Contact: self  Type: Sooner Appointment Request        Caller is requesting a sooner appointment. Caller declined first available appointment listed below. Caller will not accept being placed on the waitlist and is requesting a message be sent to doctor.        Name of Caller: Patient   Best Call Back Number: [phone]  Additional Information: Pt states he wants a release letter to get back to work on Monday 8/14/2023. Plz call pt with this info. Pt states he wants to know will that go on [employer] or can he come get it. Thanks

## 2023-08-23 ENCOUNTER — PATIENT MESSAGE (OUTPATIENT)
Dept: ORTHOPEDICS | Facility: CLINIC | Age: 58
End: 2023-08-23
Payer: COMMERCIAL

## 2023-09-05 DIAGNOSIS — Z96.651 S/P TOTAL KNEE ARTHROPLASTY, RIGHT: Primary | ICD-10-CM

## 2023-09-07 ENCOUNTER — OFFICE VISIT (OUTPATIENT)
Dept: ORTHOPEDICS | Facility: CLINIC | Age: 58
End: 2023-09-07
Payer: COMMERCIAL

## 2023-09-07 ENCOUNTER — HOSPITAL ENCOUNTER (OUTPATIENT)
Dept: RADIOLOGY | Facility: HOSPITAL | Age: 58
Discharge: HOME | End: 2023-09-07
Attending: NURSE PRACTITIONER
Payer: COMMERCIAL

## 2023-09-07 VITALS — WEIGHT: 226 LBS | BODY MASS INDEX: 32.35 KG/M2 | HEIGHT: 70 IN

## 2023-09-07 DIAGNOSIS — Z96.651 S/P TOTAL KNEE ARTHROPLASTY, RIGHT: Primary | ICD-10-CM

## 2023-09-07 DIAGNOSIS — Z96.651 S/P TOTAL KNEE ARTHROPLASTY, RIGHT: ICD-10-CM

## 2023-09-07 PROCEDURE — 1159F PR MEDICATION LIST DOCUMENTED IN MEDICAL RECORD: ICD-10-PCS | Mod: CPTII,S$GLB,, | Performed by: NURSE PRACTITIONER

## 2023-09-07 PROCEDURE — 73562 XR KNEE ORTHO RIGHT: ICD-10-PCS | Mod: 26,RT,, | Performed by: RADIOLOGY

## 2023-09-07 PROCEDURE — 73560 XR KNEE ORTHO RIGHT: ICD-10-PCS | Mod: 26,LT,, | Performed by: RADIOLOGY

## 2023-09-07 PROCEDURE — 73560 X-RAY EXAM OF KNEE 1 OR 2: CPT | Mod: TC,PO,LT

## 2023-09-07 PROCEDURE — 1160F PR REVIEW ALL MEDS BY PRESCRIBER/CLIN PHARMACIST DOCUMENTED: ICD-10-PCS | Mod: CPTII,S$GLB,, | Performed by: NURSE PRACTITIONER

## 2023-09-07 PROCEDURE — 1159F MED LIST DOCD IN RCRD: CPT | Mod: CPTII,S$GLB,, | Performed by: NURSE PRACTITIONER

## 2023-09-07 PROCEDURE — 1160F RVW MEDS BY RX/DR IN RCRD: CPT | Mod: CPTII,S$GLB,, | Performed by: NURSE PRACTITIONER

## 2023-09-07 PROCEDURE — 99024 PR POST-OP FOLLOW-UP VISIT: ICD-10-PCS | Mod: S$GLB,,, | Performed by: NURSE PRACTITIONER

## 2023-09-07 PROCEDURE — 73560 X-RAY EXAM OF KNEE 1 OR 2: CPT | Mod: 26,LT,, | Performed by: RADIOLOGY

## 2023-09-07 PROCEDURE — 99999 PR PBB SHADOW E&M-EST. PATIENT-LVL III: CPT | Mod: PBBFAC,,, | Performed by: NURSE PRACTITIONER

## 2023-09-07 PROCEDURE — 73562 X-RAY EXAM OF KNEE 3: CPT | Mod: 26,RT,, | Performed by: RADIOLOGY

## 2023-09-07 PROCEDURE — 3008F BODY MASS INDEX DOCD: CPT | Mod: CPTII,S$GLB,, | Performed by: NURSE PRACTITIONER

## 2023-09-07 PROCEDURE — 3008F PR BODY MASS INDEX (BMI) DOCUMENTED: ICD-10-PCS | Mod: CPTII,S$GLB,, | Performed by: NURSE PRACTITIONER

## 2023-09-07 PROCEDURE — 99024 POSTOP FOLLOW-UP VISIT: CPT | Mod: S$GLB,,, | Performed by: NURSE PRACTITIONER

## 2023-09-07 PROCEDURE — 99999 PR PBB SHADOW E&M-EST. PATIENT-LVL III: ICD-10-PCS | Mod: PBBFAC,,, | Performed by: NURSE PRACTITIONER

## 2023-09-07 NOTE — PROGRESS NOTES
Chief Complaint   Patient presents with    Right Knee - Post-op Evaluation       HPI:  58 y.o. returns to clinic today status post right total knee arthroplasty 6 weeks ago. Pain is intermittent. Patient is compliant most of the time with restrictions.     Right knee  ROM 0-130  Mild swelling.     Incision healed. No erythema or fluctuance. Compartments soft. Skin intact. NVI distally.      X-rays were performed today, personally reviewed by me and findings discussed with the patient.  3 views of the right knee show implants intact without any evidence of loosening.         S/P total knee arthroplasty, right      Continue P.T. per their recommendations.   Continue Robaxin as needed.     He is overall doing well.     RTC in 2 months or as needed.

## 2023-09-27 ENCOUNTER — PATIENT MESSAGE (OUTPATIENT)
Dept: ORTHOPEDICS | Facility: CLINIC | Age: 58
End: 2023-09-27
Payer: COMMERCIAL

## 2023-10-31 DIAGNOSIS — Z96.651 S/P TOTAL KNEE ARTHROPLASTY, RIGHT: Primary | ICD-10-CM

## 2023-11-02 ENCOUNTER — HOSPITAL ENCOUNTER (OUTPATIENT)
Dept: RADIOLOGY | Facility: HOSPITAL | Age: 58
Discharge: HOME | End: 2023-11-02
Attending: NURSE PRACTITIONER
Payer: COMMERCIAL

## 2023-11-02 ENCOUNTER — OFFICE VISIT (OUTPATIENT)
Dept: ORTHOPEDICS | Facility: CLINIC | Age: 58
End: 2023-11-02
Payer: COMMERCIAL

## 2023-11-02 VITALS — BODY MASS INDEX: 32.35 KG/M2 | WEIGHT: 226 LBS | HEIGHT: 70 IN

## 2023-11-02 DIAGNOSIS — Z96.651 S/P TOTAL KNEE ARTHROPLASTY, RIGHT: ICD-10-CM

## 2023-11-02 DIAGNOSIS — Z96.651 PRESENCE OF RIGHT ARTIFICIAL KNEE JOINT: Primary | ICD-10-CM

## 2023-11-02 PROCEDURE — 99213 PR OFFICE/OUTPT VISIT, EST, LEVL III, 20-29 MIN: ICD-10-PCS | Mod: S$GLB,,, | Performed by: NURSE PRACTITIONER

## 2023-11-02 PROCEDURE — 73560 X-RAY EXAM OF KNEE 1 OR 2: CPT | Mod: 26,LT,, | Performed by: RADIOLOGY

## 2023-11-02 PROCEDURE — 73562 X-RAY EXAM OF KNEE 3: CPT | Mod: 26,RT,, | Performed by: RADIOLOGY

## 2023-11-02 PROCEDURE — 73562 X-RAY EXAM OF KNEE 3: CPT | Mod: TC,PO,RT

## 2023-11-02 PROCEDURE — 99999 PR PBB SHADOW E&M-EST. PATIENT-LVL III: CPT | Mod: PBBFAC,,, | Performed by: NURSE PRACTITIONER

## 2023-11-02 PROCEDURE — 99999 PR PBB SHADOW E&M-EST. PATIENT-LVL III: ICD-10-PCS | Mod: PBBFAC,,, | Performed by: NURSE PRACTITIONER

## 2023-11-02 PROCEDURE — 1159F MED LIST DOCD IN RCRD: CPT | Mod: CPTII,S$GLB,, | Performed by: NURSE PRACTITIONER

## 2023-11-02 PROCEDURE — 1160F RVW MEDS BY RX/DR IN RCRD: CPT | Mod: CPTII,S$GLB,, | Performed by: NURSE PRACTITIONER

## 2023-11-02 PROCEDURE — 3008F PR BODY MASS INDEX (BMI) DOCUMENTED: ICD-10-PCS | Mod: CPTII,S$GLB,, | Performed by: NURSE PRACTITIONER

## 2023-11-02 PROCEDURE — 1160F PR REVIEW ALL MEDS BY PRESCRIBER/CLIN PHARMACIST DOCUMENTED: ICD-10-PCS | Mod: CPTII,S$GLB,, | Performed by: NURSE PRACTITIONER

## 2023-11-02 PROCEDURE — 1159F PR MEDICATION LIST DOCUMENTED IN MEDICAL RECORD: ICD-10-PCS | Mod: CPTII,S$GLB,, | Performed by: NURSE PRACTITIONER

## 2023-11-02 PROCEDURE — 99213 OFFICE O/P EST LOW 20 MIN: CPT | Mod: S$GLB,,, | Performed by: NURSE PRACTITIONER

## 2023-11-02 PROCEDURE — 73560 XR KNEE ORTHO RIGHT: ICD-10-PCS | Mod: 26,LT,, | Performed by: RADIOLOGY

## 2023-11-02 PROCEDURE — 73560 X-RAY EXAM OF KNEE 1 OR 2: CPT | Mod: 59,TC,PO,LT

## 2023-11-02 PROCEDURE — 73562 XR KNEE ORTHO RIGHT: ICD-10-PCS | Mod: 26,RT,, | Performed by: RADIOLOGY

## 2023-11-02 PROCEDURE — 3008F BODY MASS INDEX DOCD: CPT | Mod: CPTII,S$GLB,, | Performed by: NURSE PRACTITIONER

## 2023-11-02 RX ORDER — INDOMETHACIN 75 MG/1
75 CAPSULE, EXTENDED RELEASE ORAL
Qty: 60 CAPSULE | Refills: 2 | Status: SHIPPED | OUTPATIENT
Start: 2023-11-02 | End: 2024-01-31

## 2023-11-02 RX ORDER — METHOCARBAMOL 750 MG/1
1500 TABLET, FILM COATED ORAL 3 TIMES DAILY PRN
Qty: 60 TABLET | Refills: 2 | Status: SHIPPED | OUTPATIENT
Start: 2023-11-02 | End: 2023-12-02

## 2023-11-02 NOTE — PROGRESS NOTES
Chief Complaint   Patient presents with    Right Knee - Post-op Evaluation       HPI:   This is a 58 y.o. who returns to clinic today in follow-up on right total knee arthroplasty done approximately 3 months ago. No numbness or tingling. No associated signs or symptoms.    Past Medical History:   Diagnosis Date    Allergy     Hyperlipemia     Hypertension      Past Surgical History:   Procedure Laterality Date    COLONOSCOPY  02/22/2019    Dr Lofton, received report     EPIDURAL STEROID INJECTION INTO LUMBAR SPINE N/A 9/28/2022    Procedure: Injection-steroid-epidural-lumbar L5/S1 to right;  Surgeon: Benny Pritchard MD;  Location: [facility] OR;  Service: Pain Management;  Laterality: N/A;    HERNIA REPAIR      ROBOTIC ARTHROPLASTY, KNEE Right 7/24/2023    Procedure: ROBOTIC ARTHROPLASTY, KNEE, TOTAL - Kiet;  Surgeon: Humberto Mitchell MD;  Location: [facility] OR;  Service: Orthopedics;  Laterality: Right;     Current Outpatient Medications on File Prior to Visit   Medication Sig Dispense Refill    acetaminophen (TYLENOL) 500 MG tablet Take 2 tablets (1,000 mg total) by mouth every 8 (eight) hours. 90 tablet 0    amLODIPine (NORVASC) 5 MG tablet Take 1 tablet (5 mg total) by mouth once daily. 90 tablet 3    atorvastatin (LIPITOR) 20 MG tablet Take 1 tablet (20 mg total) by mouth once daily. 90 tablet 3    carvediloL (COREG) 6.25 MG tablet Take 1 tablet (6.25 mg total) by mouth 2 (two) times daily with meals. 180 tablet 1    FLUoxetine 40 MG capsule Take 1 capsule (40 mg total) by mouth once daily. 90 capsule 3    [DISCONTINUED] aspirin (ECOTRIN) 325 MG EC tablet Take 1 tablet (325 mg total) by mouth once daily. 30 tablet 0    [DISCONTINUED] diclofenac sodium (VOLTAREN) 1 % Gel APPLY 4 GRAMS TOPICALLY FOUR TIMES A DAY AS NEEDED FOR PAIN AND INFLAMMATION. USE ENCLOSED DOSING CARD. BILATERAL KNEE PAIN      [DISCONTINUED] DOCUSATE SODIUM ORAL Take 100 mg by mouth daily as needed (constipation). Indications: constipation       [DISCONTINUED] meloxicam (MOBIC) 15 MG tablet Take 1 tablet (15 mg total) by mouth once daily. 30 tablet 11    [DISCONTINUED] methocarbamoL (ROBAXIN) 750 MG Tab Take 1 tablet (750 mg total) by mouth 4 (four) times daily as needed. 44 tablet 3    [DISCONTINUED] oxyCODONE (ROXICODONE) 5 MG immediate release tablet Take 1 tablet (5 mg total) by mouth every 4 (four) hours as needed for Pain. 22 tablet 0    [DISCONTINUED] pregabalin (LYRICA) 75 MG capsule Take 1 capsule (75 mg total) by mouth every evening. for 15 days 15 capsule 0    [DISCONTINUED] traMADoL (ULTRAM) 50 mg tablet Take 1 tablet (50 mg total) by mouth every 4 (four) hours as needed for Pain. 44 tablet 0     Current Facility-Administered Medications on File Prior to Visit   Medication Dose Route Frequency Provider Last Rate Last Admin    sodium chloride 0.9% flush 10 mL  10 mL Intravenous PRN Humberto Mitchell MD         Review of patient's allergies indicates:  No Known Allergies  Family History   Problem Relation Age of Onset    Hypertension Mother     Diabetes Father      Social History     Socioeconomic History    Marital status:    Tobacco Use    Smoking status: Never    Smokeless tobacco: Never   Substance and Sexual Activity    Alcohol use: Yes     Alcohol/week: 0.0 standard drinks of alcohol     Comment: occasional    Drug use: No    Sexual activity: Yes     Partners: Female     Social Determinants of Health     Financial Resource Strain: Low Risk  (11/2/2023)    Overall Financial Resource Strain (CARDIA)     Difficulty of Paying Living Expenses: Not very hard   Food Insecurity: No Food Insecurity (11/2/2023)    Hunger Vital Sign     Worried About Running Out of Food in the Last Year: Never true     Ran Out of Food in the Last Year: Never true   Transportation Needs: No Transportation Needs (11/2/2023)    PRAPARE - Transportation     Lack of Transportation (Medical): No     Lack of Transportation (Non-Medical): No   Physical Activity: Inactive  (11/2/2023)    Exercise Vital Sign     Days of Exercise per Week: 0 days     Minutes of Exercise per Session: 0 min   Stress: No Stress Concern Present (11/2/2023)    [employer] [employer] - Occupational Stress Questionnaire     Feeling of Stress : Only a little   Social Connections: Unknown (11/2/2023)    Social Connection and Isolation Panel [NHANES]     Frequency of Communication with Friends and Family: Once a week     Frequency of Social Gatherings with Friends and Family: Once a week     Active Member of Clubs or Organizations: No     Attends Club or Organization Meetings: Never     Marital Status:    Housing Stability: Low Risk  (11/2/2023)    Housing Stability Vital Sign     Unable to Pay for Housing in the Last Year: No     Number of Places Lived in the Last Year: 1     Unstable Housing in the Last Year: No       Review of Systems:  Constitutional:  Denies fever or chills   Eyes:  Denies change in visual acuity   HENT:  Denies nasal congestion or sore throat   Respiratory:  Denies cough or shortness of breath   Cardiovascular:  Denies chest pain or edema   GI:  Denies abdominal pain, nausea, vomiting, bloody stools or diarrhea   :  Denies dysuria   Integument:  Denies rash   Neurologic:  Denies headache, focal weakness or sensory changes   Endocrine:  Denies polyuria or polydipsia   Lymphatic:  Denies swollen glands   Psychiatric:  Denies depression or anxiety     Physical Exam:   Constitutional:  Well developed, well nourished, no acute distress, non-toxic appearance   Integument:  Well hydrated  Neurologic:  Alert & oriented x 3  Psychiatric:  Speech and behavior appropriate     Bilateral Knee Exam    Right Knee Exam   Tenderness   The patient is experiencing no tenderness in the medial joint line.    Range of Motion   0-130    Muscle Strength   The patient has normal bilateral knee strength.    Tests   Angelina:  Medial - negative  Lachman:  Anterior - negative      Varus:  negative  Valgus: negative  Patellar Apprehension: negative  Patellar movement with extension; when leg relaxes patella       Other   Erythema: absent  Sensation: normal  Pulse: present  Swelling: mild to moderate    Left Knee exam   Knee exam performed same as contralateral side and is normal          X-rays were performed today, personally reviewed by me and findings discussed with the patient.  3 views of the right knee show suprapatellar joint effusion with implants intact.       Presence of right artificial knee joint  -     MRI Knee Without Contrast Right; Future; Expected date: 11/02/2023  -     methocarbamoL (ROBAXIN) 750 MG Tab; Take 2 tablets (1,500 mg total) by mouth 3 (three) times daily as needed.  Dispense: 60 tablet; Refill: 2  -     indomethacin (INDOCIN SR) 75 mg CpSR CR capsule; Take 1 capsule (75 mg total) by mouth 2 times daily 2 hours after meal.  Dispense: 60 capsule; Refill: 2    Will obtain MRI of right knee to rule out retinaculum tear.  Will call with results  Rtc as needed.

## 2023-11-05 DIAGNOSIS — I10 ESSENTIAL HYPERTENSION: Primary | ICD-10-CM

## 2023-11-05 DIAGNOSIS — I49.49 PREMATURE BEATS: ICD-10-CM

## 2023-11-06 RX ORDER — CARVEDILOL 6.25 MG/1
6.25 TABLET ORAL 2 TIMES DAILY WITH MEALS
Qty: 180 TABLET | Refills: 1 | Status: SHIPPED | OUTPATIENT
Start: 2023-11-06

## 2023-11-16 ENCOUNTER — HOSPITAL ENCOUNTER (OUTPATIENT)
Dept: RADIOLOGY | Facility: HOSPITAL | Age: 58
Discharge: HOME | End: 2023-11-16
Attending: NURSE PRACTITIONER
Payer: COMMERCIAL

## 2023-11-16 DIAGNOSIS — Z96.651 PRESENCE OF RIGHT ARTIFICIAL KNEE JOINT: ICD-10-CM

## 2023-11-16 PROCEDURE — 73721 MRI KNEE WITHOUT CONTRAST RIGHT: ICD-10-PCS | Mod: 26,RT,, | Performed by: RADIOLOGY

## 2023-11-16 PROCEDURE — 73721 MRI JNT OF LWR EXTRE W/O DYE: CPT | Mod: 26,RT,, | Performed by: RADIOLOGY

## 2023-11-16 PROCEDURE — 73721 MRI JNT OF LWR EXTRE W/O DYE: CPT | Mod: TC,PO,RT

## 2023-12-05 ENCOUNTER — OFFICE VISIT (OUTPATIENT)
Dept: ORTHOPEDICS | Facility: CLINIC | Age: 58
End: 2023-12-05
Payer: COMMERCIAL

## 2023-12-05 VITALS — BODY MASS INDEX: 32.35 KG/M2 | HEIGHT: 70 IN | WEIGHT: 226 LBS

## 2023-12-05 DIAGNOSIS — S86.811A TRAUMATIC MEDIAL RETINACULAR TEAR OF RIGHT KNEE, INITIAL ENCOUNTER: Primary | ICD-10-CM

## 2023-12-05 PROCEDURE — 99999 PR PBB SHADOW E&M-EST. PATIENT-LVL III: CPT | Mod: PBBFAC,,, | Performed by: ORTHOPAEDIC SURGERY

## 2023-12-05 PROCEDURE — 1160F PR REVIEW ALL MEDS BY PRESCRIBER/CLIN PHARMACIST DOCUMENTED: ICD-10-PCS | Mod: CPTII,S$GLB,, | Performed by: ORTHOPAEDIC SURGERY

## 2023-12-05 PROCEDURE — 1159F MED LIST DOCD IN RCRD: CPT | Mod: CPTII,S$GLB,, | Performed by: ORTHOPAEDIC SURGERY

## 2023-12-05 PROCEDURE — 1160F RVW MEDS BY RX/DR IN RCRD: CPT | Mod: CPTII,S$GLB,, | Performed by: ORTHOPAEDIC SURGERY

## 2023-12-05 PROCEDURE — 3008F BODY MASS INDEX DOCD: CPT | Mod: CPTII,S$GLB,, | Performed by: ORTHOPAEDIC SURGERY

## 2023-12-05 PROCEDURE — 1159F PR MEDICATION LIST DOCUMENTED IN MEDICAL RECORD: ICD-10-PCS | Mod: CPTII,S$GLB,, | Performed by: ORTHOPAEDIC SURGERY

## 2023-12-05 PROCEDURE — 99999 PR PBB SHADOW E&M-EST. PATIENT-LVL III: ICD-10-PCS | Mod: PBBFAC,,, | Performed by: ORTHOPAEDIC SURGERY

## 2023-12-05 PROCEDURE — 99214 PR OFFICE/OUTPT VISIT, EST, LEVL IV, 30-39 MIN: ICD-10-PCS | Mod: S$GLB,,, | Performed by: ORTHOPAEDIC SURGERY

## 2023-12-05 PROCEDURE — 3008F PR BODY MASS INDEX (BMI) DOCUMENTED: ICD-10-PCS | Mod: CPTII,S$GLB,, | Performed by: ORTHOPAEDIC SURGERY

## 2023-12-05 PROCEDURE — 99214 OFFICE O/P EST MOD 30 MIN: CPT | Mod: S$GLB,,, | Performed by: ORTHOPAEDIC SURGERY

## 2023-12-05 RX ORDER — SODIUM CHLORIDE 0.9 % (FLUSH) 0.9 %
10 SYRINGE (ML) INJECTION EVERY 6 HOURS PRN
Status: DISCONTINUED | OUTPATIENT
Start: 2023-12-18 | End: 2024-01-16

## 2023-12-12 NOTE — H&P
Chief Complaint   Patient presents with    Right Knee - Pain       HPI:   This is a 58 y.o. who returns today status post right TKA 4 months ago. Patient has been FWB.  He notes increased pain after lunges with PT and incresed swelling. Pain is throbbing. No numbness or tingling. No associated signs or symptoms.    Past Medical History:   Diagnosis Date    Allergy     Hyperlipemia     Hypertension      Past Surgical History:   Procedure Laterality Date    COLONOSCOPY  02/22/2019    Dr Lofton, received report     EPIDURAL STEROID INJECTION INTO LUMBAR SPINE N/A 9/28/2022    Procedure: Injection-steroid-epidural-lumbar L5/S1 to right;  Surgeon: Benny Pritchard MD;  Location: [facility] OR;  Service: Pain Management;  Laterality: N/A;    HERNIA REPAIR      ROBOTIC ARTHROPLASTY, KNEE Right 7/24/2023    Procedure: ROBOTIC ARTHROPLASTY, KNEE, TOTAL - Kiet;  Surgeon: Humberto Mitchell MD;  Location: [facility] OR;  Service: Orthopedics;  Laterality: Right;     Current Outpatient Medications on File Prior to Visit   Medication Sig Dispense Refill    acetaminophen (TYLENOL) 500 MG tablet Take 2 tablets (1,000 mg total) by mouth every 8 (eight) hours. 90 tablet 0    amLODIPine (NORVASC) 5 MG tablet Take 1 tablet (5 mg total) by mouth once daily. 90 tablet 3    atorvastatin (LIPITOR) 20 MG tablet Take 1 tablet (20 mg total) by mouth once daily. 90 tablet 3    carvediloL (COREG) 6.25 MG tablet TAKE 1 TABLET BY MOUTH TWICE DAILY WITH MEALS 180 tablet 1    FLUoxetine 40 MG capsule Take 1 capsule (40 mg total) by mouth once daily. 90 capsule 3    indomethacin (INDOCIN SR) 75 mg CpSR CR capsule Take 1 capsule (75 mg total) by mouth 2 times daily 2 hours after meal. 60 capsule 2    [DISCONTINUED] diclofenac sodium (VOLTAREN) 1 % Gel APPLY 4 GRAMS TOPICALLY FOUR TIMES A DAY AS NEEDED FOR PAIN AND INFLAMMATION. USE ENCLOSED DOSING CARD. BILATERAL KNEE PAIN       Current Facility-Administered Medications on File Prior to Visit   Medication Dose  Route Frequency Provider Last Rate Last Admin    sodium chloride 0.9% flush 10 mL  10 mL Intravenous PRN Humberto Mitchell MD         Review of patient's allergies indicates:  No Known Allergies  Family History   Problem Relation Age of Onset    Hypertension Mother     Diabetes Father      Social History     Socioeconomic History    Marital status:    Tobacco Use    Smoking status: Never    Smokeless tobacco: Never   Substance and Sexual Activity    Alcohol use: Yes     Alcohol/week: 0.0 standard drinks of alcohol     Comment: occasional    Drug use: No    Sexual activity: Yes     Partners: Female     Social Determinants of Health     Financial Resource Strain: Low Risk  (12/4/2023)    Overall Financial Resource Strain (CARDIA)     Difficulty of Paying Living Expenses: Not hard at all   Food Insecurity: No Food Insecurity (12/4/2023)    Hunger Vital Sign     Worried About Running Out of Food in the Last Year: Never true     Ran Out of Food in the Last Year: Never true   Transportation Needs: No Transportation Needs (12/4/2023)    PRAPARE - Transportation     Lack of Transportation (Medical): No     Lack of Transportation (Non-Medical): No   Physical Activity: Inactive (12/4/2023)    Exercise Vital Sign     Days of Exercise per Week: 0 days     Minutes of Exercise per Session: 0 min   Stress: No Stress Concern Present (12/4/2023)    [employer] [place] [employer] - Occupational Stress Questionnaire     Feeling of Stress : Not at all   Social Connections: Unknown (12/4/2023)    Social Connection and Isolation Panel [NHANES]     Frequency of Communication with Friends and Family: Once a week     Frequency of Social Gatherings with Friends and Family: Once a week     Active Member of Clubs or Organizations: No     Attends Club or Organization Meetings: Never     Marital Status:    Housing Stability: Low Risk  (12/4/2023)    Housing Stability Vital Sign     Unable to Pay for Housing in the Last  Year: No     Number of Places Lived in the Last Year: 1     Unstable Housing in the Last Year: No       Review of Systems:  Constitutional:  Denies fever or chills   Eyes:  Denies change in visual acuity   HENT:  Denies nasal congestion or sore throat   Respiratory:  Denies cough or shortness of breath   Cardiovascular:  Denies chest pain or edema   GI:  Denies abdominal pain, nausea, vomiting, bloody stools or diarrhea   :  Denies dysuria   Integument:  Denies rash   Neurologic:  Denies headache, focal weakness or sensory changes   Endocrine:  Denies polyuria or polydipsia   Lymphatic:  Denies swollen glands   Psychiatric:  Denies depression or anxiety     Physical Exam:   Constitutional:  Well developed, well nourished, no acute distress, non-toxic appearance   Integument:  Well hydrated, no rash   Lymphatic:  No lymphadenopathy noted   Neurologic:  Alert & oriented x 3, CN 2-12 normal, normal motor function, normal sensory function, no focal deficits noted   Psychiatric:  Speech and behavior appropriate   Gi: abdomen soft  Eyes: EOMI    L knee  Exam performed same as contralateral side and is normal  R knee  FROM. Increased swelling noted. Increased patellar excursion. Palpable gapping about the medial retinaculum. NVI distally.     X-rays were performed today, personally reviewed by me and findings discussed with the patient.  3 views of the right knee show lateral patellar tilt      Traumatic medial retinacular tear of right knee, initial encounter  -     Vital signs; Standing  -     Diet NPO; Standing  -     Place EDIE hose; Standing  -     Place sequential compression device; Standing  -     Case Request Operating Room: REPAIR, RETINACULUM, KNEE  -     Place in Outpatient; Standing    Traumatic medial retinacular tear of right knee    Other orders  -     sodium chloride 0.9% flush 10 mL  -     IP VTE LOW RISK PATIENT; Standing  -     tranexamic acid (CYKLOKAPRON) 1,000 mg in sodium chloride 0.9% 100 mL  -      ceFAZolin (ANCEF) 2 g in dextrose 5 % (D5W) 50 mL IVPB          I had a long discussion with the patient regarding the benefits and risks of medial retinacular repair. They voiced understanding and wish to proceed with surgery. Consents signed in clinic.

## 2023-12-13 ENCOUNTER — TELEPHONE (OUTPATIENT)
Dept: ORTHOPEDICS | Facility: CLINIC | Age: 58
End: 2023-12-13
Payer: COMMERCIAL

## 2023-12-13 NOTE — TELEPHONE ENCOUNTER
----- Message from Efrain Rome sent at 12/13/2023 12:12 PM CST -----  Regarding: pre Sx questions, call pt   Contact: pt   pre Sx questions, call pt

## 2023-12-18 PROBLEM — S86.811A TRAUMATIC MEDIAL RETINACULAR TEAR OF RIGHT KNEE: Status: ACTIVE | Noted: 2023-12-18

## 2023-12-20 ENCOUNTER — PATIENT MESSAGE (OUTPATIENT)
Dept: ORTHOPEDICS | Facility: CLINIC | Age: 58
End: 2023-12-20
Payer: COMMERCIAL

## 2023-12-20 DIAGNOSIS — S86.811A TRAUMATIC MEDIAL RETINACULAR TEAR OF RIGHT KNEE, INITIAL ENCOUNTER: Primary | ICD-10-CM

## 2023-12-26 ENCOUNTER — TELEPHONE (OUTPATIENT)
Dept: ORTHOPEDICS | Facility: CLINIC | Age: 58
End: 2023-12-26
Payer: COMMERCIAL

## 2023-12-26 NOTE — TELEPHONE ENCOUNTER
----- Message from Efrain Rome sent at 12/26/2023 11:37 AM CST -----  Regarding: said Dr WHELAN told him to come in today to have his dressing changed, call pt   Contact: pt   said Dr WHELAN told him to come in today to have his dressing changed, call pt

## 2024-01-02 ENCOUNTER — HOSPITAL ENCOUNTER (OUTPATIENT)
Dept: RADIOLOGY | Facility: HOSPITAL | Age: 59
Discharge: HOME | End: 2024-01-02
Attending: ORTHOPAEDIC SURGERY
Payer: COMMERCIAL

## 2024-01-02 ENCOUNTER — OFFICE VISIT (OUTPATIENT)
Dept: ORTHOPEDICS | Facility: CLINIC | Age: 59
End: 2024-01-02
Payer: COMMERCIAL

## 2024-01-02 DIAGNOSIS — S86.811A TRAUMATIC MEDIAL RETINACULAR TEAR OF RIGHT KNEE, INITIAL ENCOUNTER: Primary | ICD-10-CM

## 2024-01-02 DIAGNOSIS — S86.811A TRAUMATIC MEDIAL RETINACULAR TEAR OF RIGHT KNEE, INITIAL ENCOUNTER: ICD-10-CM

## 2024-01-02 DIAGNOSIS — M17.12 PRIMARY OSTEOARTHRITIS OF LEFT KNEE: ICD-10-CM

## 2024-01-02 PROCEDURE — 73562 X-RAY EXAM OF KNEE 3: CPT | Mod: 26,RT,, | Performed by: RADIOLOGY

## 2024-01-02 PROCEDURE — 99024 POSTOP FOLLOW-UP VISIT: CPT | Mod: S$GLB,,, | Performed by: ORTHOPAEDIC SURGERY

## 2024-01-02 PROCEDURE — 1159F MED LIST DOCD IN RCRD: CPT | Mod: CPTII,S$GLB,, | Performed by: ORTHOPAEDIC SURGERY

## 2024-01-02 PROCEDURE — 73560 X-RAY EXAM OF KNEE 1 OR 2: CPT | Mod: TC,PO,LT

## 2024-01-02 PROCEDURE — 73562 X-RAY EXAM OF KNEE 3: CPT | Mod: TC,PO,RT

## 2024-01-02 PROCEDURE — 1160F RVW MEDS BY RX/DR IN RCRD: CPT | Mod: CPTII,S$GLB,, | Performed by: ORTHOPAEDIC SURGERY

## 2024-01-02 PROCEDURE — 20610 DRAIN/INJ JOINT/BURSA W/O US: CPT | Mod: 79,LT,S$GLB, | Performed by: ORTHOPAEDIC SURGERY

## 2024-01-02 PROCEDURE — 73560 X-RAY EXAM OF KNEE 1 OR 2: CPT | Mod: 26,59,LT, | Performed by: RADIOLOGY

## 2024-01-02 PROCEDURE — 99999 PR PBB SHADOW E&M-EST. PATIENT-LVL III: CPT | Mod: PBBFAC,,, | Performed by: ORTHOPAEDIC SURGERY

## 2024-01-02 RX ADMIN — TRIAMCINOLONE ACETONIDE 40 MG: 40 INJECTION, SUSPENSION INTRA-ARTICULAR; INTRAMUSCULAR at 03:01

## 2024-01-05 RX ORDER — TRIAMCINOLONE ACETONIDE 40 MG/ML
40 INJECTION, SUSPENSION INTRA-ARTICULAR; INTRAMUSCULAR
Status: DISCONTINUED | OUTPATIENT
Start: 2024-01-02 | End: 2024-01-05 | Stop reason: HOSPADM

## 2024-01-05 NOTE — PROCEDURES
Large Joint Aspiration/Injection: L knee    Date/Time: 1/2/2024 3:15 PM    Performed by: Humberto Mitchell MD  Authorized by: Humberto Mitchell MD    Consent Done?:  Yes (Verbal)  Indications:  Pain  Timeout: prior to procedure the correct patient, procedure, and site was verified    Prep: patient was prepped and draped in usual sterile fashion    Local anesthetic:  Lidocaine 1% without epinephrine  Anesthetic total (ml):  5      Details:  Needle Size:  21 G  Approach:  Anterolateral  Location:  Knee  Site:  L knee  Medications:  40 mg triamcinolone acetonide 40 mg/mL  Patient tolerance:  Patient tolerated the procedure well with no immediate complications

## 2024-01-05 NOTE — PROGRESS NOTES
Chief Complaint   Patient presents with    Right Knee - Post-op Evaluation       HPI:  58 y.o. male returns to clinic today status post right medial retinacular repair 2 weeks ago. Pain is now gone. Patient is compliant most of the time with restrictions.     right knee: ROM 0-120. Overall normal alignment. Incision healed. No erythema or fluctuance. Stable to stress. Skin intact. Compartments soft. NVI distally.     X-rays were performed today, personally reviewed by me and findings discussed with the patient.  3 views of the right knee show implants intact in good position    Traumatic medial retinacular tear of right knee, initial encounter    Primary osteoarthritis of left knee          Using an aseptic technique, I injected 5 cc of lidocaine 1% without and 1 cc of kenalog 40mg into the left Knee. The patient tolerated this well. I will have them return to clinic in 4 weeks.

## 2024-01-16 ENCOUNTER — HOSPITAL ENCOUNTER (OUTPATIENT)
Dept: RADIOLOGY | Facility: HOSPITAL | Age: 59
Discharge: HOME | End: 2024-01-16
Attending: NURSE PRACTITIONER
Payer: COMMERCIAL

## 2024-01-16 ENCOUNTER — OFFICE VISIT (OUTPATIENT)
Dept: ORTHOPEDICS | Facility: CLINIC | Age: 59
End: 2024-01-16
Payer: COMMERCIAL

## 2024-01-16 VITALS — HEIGHT: 70 IN | WEIGHT: 231 LBS | BODY MASS INDEX: 33.07 KG/M2

## 2024-01-16 DIAGNOSIS — S86.819A: ICD-10-CM

## 2024-01-16 DIAGNOSIS — Z98.890 POST-OPERATIVE STATE: ICD-10-CM

## 2024-01-16 DIAGNOSIS — M25.561 RIGHT KNEE PAIN, UNSPECIFIED CHRONICITY: ICD-10-CM

## 2024-01-16 DIAGNOSIS — Z96.651 PRESENCE OF RIGHT ARTIFICIAL KNEE JOINT: ICD-10-CM
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
## 2024-01-16 DIAGNOSIS — M25.561 RIGHT KNEE PAIN, UNSPECIFIED CHRONICITY: Primary | ICD-10-CM

## 2024-01-16 PROBLEM — M17.9 OSTEOARTHRITIS OF KNEE, UNSPECIFIED: Status: ACTIVE | Noted: 2024-01-16

## 2024-01-16 PROCEDURE — 99024 POSTOP FOLLOW-UP VISIT: CPT | Mod: S$GLB,,, | Performed by: NURSE PRACTITIONER

## 2024-01-16 PROCEDURE — 73560 X-RAY EXAM OF KNEE 1 OR 2: CPT | Mod: 59,TC,PO,LT

## 2024-01-16 PROCEDURE — 73562 X-RAY EXAM OF KNEE 3: CPT | Mod: TC,PO,RT

## 2024-01-16 PROCEDURE — 73562 X-RAY EXAM OF KNEE 3: CPT | Mod: 26,RT,, | Performed by: RADIOLOGY

## 2024-01-16 PROCEDURE — 1160F RVW MEDS BY RX/DR IN RCRD: CPT | Mod: CPTII,S$GLB,, | Performed by: NURSE PRACTITIONER

## 2024-01-16 PROCEDURE — 1159F MED LIST DOCD IN RCRD: CPT | Mod: CPTII,S$GLB,, | Performed by: NURSE PRACTITIONER

## 2024-01-16 PROCEDURE — 99999 PR PBB SHADOW E&M-EST. PATIENT-LVL IV: CPT | Mod: PBBFAC,,, | Performed by: NURSE PRACTITIONER

## 2024-01-16 PROCEDURE — 73560 X-RAY EXAM OF KNEE 1 OR 2: CPT | Mod: 26,59,LT, | Performed by: RADIOLOGY

## 2024-01-16 NOTE — PROGRESS NOTES
Chief Complaint   Patient presents with    Right Knee - Pain       HPI:  58 y.o. returns to clinic today status post right total knee arthroplasty with recent retinacular tear repair done about 4 weeks ago. Pain is intermittent. Patient is compliant most of the time with restrictions. Pt reports he fell again. Thinks he tore something as he felt something pop.       Bilateral Knee Exam    Right Knee Exam   Tenderness   The patient is experiencing tenderness to entire knee. Fluctuance noted to medial retinacular.     Range of Motion   Flexion: abnormal     Muscle Strength   The patient has normal left knee strength.    Tests   Angelina:  Medial - negative  Lachman:  Anterior - negative      Varus: negative  Valgus: negative  Patellar Apprehension: negative      Other   Erythema: absent  Sensation: normal  Pulse: present  Swelling: moderate  Incision healed.     Left Knee exam   Knee exam performed same as contralateral side and is normal        X-rays were performed today, personally reviewed by me and findings discussed with the patient.  3 views of the right knee show soft tissue swelling; implants intact without any evidence of loosening.           Fall, initial encounter    Post-operative state    Traumatic medial retinacular tear of knee, initial encounter    Presence of right artificial knee joint  -     MRI Knee Without Contrast Right; Future; Expected date: 01/16/2024        Spoke with Dr. Mitchell; will obtain MRI stat to rule out tear of retinaculum again.

## 2024-01-27 ENCOUNTER — HOSPITAL ENCOUNTER (OUTPATIENT)
Dept: RADIOLOGY | Facility: HOSPITAL | Age: 59
Discharge: HOME | End: 2024-01-27
Attending: NURSE PRACTITIONER
Payer: COMMERCIAL

## 2024-01-27 DIAGNOSIS — Z96.651 PRESENCE OF RIGHT ARTIFICIAL KNEE JOINT: ICD-10-CM

## 2024-01-27 PROCEDURE — 73721 MRI JNT OF LWR EXTRE W/O DYE: CPT | Mod: TC,PO,RT

## 2024-01-27 PROCEDURE — 73721 MRI JNT OF LWR EXTRE W/O DYE: CPT | Mod: 26,RT,, | Performed by: RADIOLOGY

## 2024-01-29 ENCOUNTER — PATIENT MESSAGE (OUTPATIENT)
Dept: ORTHOPEDICS | Facility: CLINIC | Age: 59
End: 2024-01-29
Payer: COMMERCIAL

## 2024-01-31 ENCOUNTER — TELEPHONE (OUTPATIENT)
Dept: ORTHOPEDICS | Facility: CLINIC | Age: 59
End: 2024-01-31
Payer: COMMERCIAL

## 2024-01-31 NOTE — TELEPHONE ENCOUNTER
----- Message from Tanya Wakefield MA sent at 1/31/2024 11:01 AM CST -----  Contact: pt  Wants to schedule surgery   Call back  [phone]

## 2024-02-01 ENCOUNTER — TELEPHONE (OUTPATIENT)
Dept: ORTHOPEDICS | Facility: CLINIC | Age: 59
End: 2024-02-01
Payer: COMMERCIAL

## 2024-02-01 NOTE — TELEPHONE ENCOUNTER
----- Message from Tanya Wakefield MA sent at 2/1/2024  3:02 PM CST -----  Contact: pt  Calling  AGAIN to schedule surgery   Call back  [phone]

## 2024-02-15 DIAGNOSIS — M25.561 RIGHT KNEE PAIN, UNSPECIFIED CHRONICITY: Primary | ICD-10-CM

## 2024-02-20 ENCOUNTER — HOSPITAL ENCOUNTER (OUTPATIENT)
Dept: RADIOLOGY | Facility: HOSPITAL | Age: 59
Discharge: HOME | End: 2024-02-20
Attending: ORTHOPAEDIC SURGERY
Payer: COMMERCIAL

## 2024-02-20 ENCOUNTER — OFFICE VISIT (OUTPATIENT)
Dept: ORTHOPEDICS | Facility: CLINIC | Age: 59
End: 2024-02-20
Payer: COMMERCIAL

## 2024-02-20 VITALS — HEIGHT: 70 IN | BODY MASS INDEX: 33.5 KG/M2 | WEIGHT: 234 LBS

## 2024-02-20 DIAGNOSIS — S86.819A: Primary | ICD-10-CM

## 2024-02-20 DIAGNOSIS — M25.561 RIGHT KNEE PAIN, UNSPECIFIED CHRONICITY: ICD-10-CM

## 2024-02-20 PROCEDURE — 1160F RVW MEDS BY RX/DR IN RCRD: CPT | Mod: CPTII,S$GLB,, | Performed by: ORTHOPAEDIC SURGERY

## 2024-02-20 PROCEDURE — 73560 X-RAY EXAM OF KNEE 1 OR 2: CPT | Mod: 26,59,LT, | Performed by: RADIOLOGY

## 2024-02-20 PROCEDURE — 73562 X-RAY EXAM OF KNEE 3: CPT | Mod: 26,RT,, | Performed by: RADIOLOGY

## 2024-02-20 PROCEDURE — 99024 POSTOP FOLLOW-UP VISIT: CPT | Mod: S$GLB,,, | Performed by: ORTHOPAEDIC SURGERY

## 2024-02-20 PROCEDURE — 1159F MED LIST DOCD IN RCRD: CPT | Mod: CPTII,S$GLB,, | Performed by: ORTHOPAEDIC SURGERY

## 2024-02-20 PROCEDURE — 73560 X-RAY EXAM OF KNEE 1 OR 2: CPT | Mod: 59,TC,PO,LT

## 2024-02-20 PROCEDURE — 73562 X-RAY EXAM OF KNEE 3: CPT | Mod: TC,PO,RT

## 2024-02-20 PROCEDURE — 99999 PR PBB SHADOW E&M-EST. PATIENT-LVL III: CPT | Mod: PBBFAC,,, | Performed by: ORTHOPAEDIC SURGERY

## 2024-02-22 NOTE — PROGRESS NOTES
Chief Complaint   Patient presents with    Right Knee - Post-op Evaluation       HPI:  58 y.o. male returns to clinic today status post right medial retinacular repair 8 weeks ago. Pain is improved from stumble 2 weeks ago. Patient is compliant most of the time with restrictions.     right knee: ROM 0-120. Overall normal alignment. Incision healed. No erythema or fluctuance. Palpable 3 cm gap anteromedial to the patella along the retinaculum. Stable to stress. Skin intact. Compartments soft. NVI distally. Patella stable.     X-rays were performed today, personally reviewed by me and findings discussed with the patient.  3 views of the right knee show implants intact in good position    Traumatic medial retinacular tear of knee, initial encounter        I had a long discussion regarding treatment options and he wishes to see how it does. RTC 4 weeks.

## 2024-03-05 ENCOUNTER — PATIENT MESSAGE (OUTPATIENT)
Dept: ORTHOPEDICS | Facility: CLINIC | Age: 59
End: 2024-03-05
Payer: COMMERCIAL

## 2024-03-15 DIAGNOSIS — S86.819A: Primary | ICD-10-CM

## 2024-03-21 ENCOUNTER — HOSPITAL ENCOUNTER (OUTPATIENT)
Dept: RADIOLOGY | Facility: HOSPITAL | Age: 59
Discharge: HOME | End: 2024-03-21
Attending: ORTHOPAEDIC SURGERY
Payer: COMMERCIAL

## 2024-03-21 ENCOUNTER — OFFICE VISIT (OUTPATIENT)
Dept: ORTHOPEDICS | Facility: CLINIC | Age: 59
End: 2024-03-21
Payer: COMMERCIAL

## 2024-03-21 VITALS — HEIGHT: 70 IN | WEIGHT: 233.94 LBS | BODY MASS INDEX: 33.49 KG/M2

## 2024-03-21 DIAGNOSIS — S86.819A: ICD-10-CM

## 2024-03-21 DIAGNOSIS — S86.819A: Primary | ICD-10-CM

## 2024-03-21 PROCEDURE — 73560 X-RAY EXAM OF KNEE 1 OR 2: CPT | Mod: TC,59,PO,LT

## 2024-03-21 PROCEDURE — 3008F BODY MASS INDEX DOCD: CPT | Mod: CPTII,S$GLB,, | Performed by: ORTHOPAEDIC SURGERY

## 2024-03-21 PROCEDURE — 1160F RVW MEDS BY RX/DR IN RCRD: CPT | Mod: CPTII,S$GLB,, | Performed by: ORTHOPAEDIC SURGERY

## 2024-03-21 PROCEDURE — 1159F MED LIST DOCD IN RCRD: CPT | Mod: CPTII,S$GLB,, | Performed by: ORTHOPAEDIC SURGERY

## 2024-03-21 PROCEDURE — 73562 X-RAY EXAM OF KNEE 3: CPT | Mod: 26,RT,, | Performed by: RADIOLOGY

## 2024-03-21 PROCEDURE — 73560 X-RAY EXAM OF KNEE 1 OR 2: CPT | Mod: 26,59,LT, | Performed by: RADIOLOGY

## 2024-03-21 PROCEDURE — 99999 PR PBB SHADOW E&M-EST. PATIENT-LVL III: CPT | Mod: PBBFAC,,, | Performed by: ORTHOPAEDIC SURGERY

## 2024-03-21 PROCEDURE — 99214 OFFICE O/P EST MOD 30 MIN: CPT | Mod: S$GLB,,, | Performed by: ORTHOPAEDIC SURGERY

## 2024-03-21 PROCEDURE — 73562 X-RAY EXAM OF KNEE 3: CPT | Mod: TC,PO,RT

## 2024-03-28 NOTE — PROGRESS NOTES
Chief Complaint   Patient presents with    Right Knee - Pain       HPI:   This is a 59 y.o. who returns today status post right MRR 3 months ago. Patient has been FWB.  Pain is dull. No numbness or tingling. No associated signs or symptoms.    Past Medical History:   Diagnosis Date    Allergy     Hyperlipemia     Hypertension      Past Surgical History:   Procedure Laterality Date    COLONOSCOPY  02/22/2019    Dr Lofton, received report     EPIDURAL STEROID INJECTION INTO LUMBAR SPINE N/A 9/28/2022    Procedure: Injection-steroid-epidural-lumbar L5/S1 to right;  Surgeon: Benny Pritchard MD;  Location: [facility] OR;  Service: Pain Management;  Laterality: N/A;    HERNIA REPAIR      REPAIR, RETINACULUM, KNEE Right 12/18/2023    Procedure: REPAIR, RETINACULUM, KNEE;  Surgeon: Humberto Mitchell MD;  Location: [facility] OR;  Service: Orthopedics;  Laterality: Right;    ROBOTIC ARTHROPLASTY, KNEE Right 7/24/2023    Procedure: ROBOTIC ARTHROPLASTY, KNEE, TOTAL - Kiet;  Surgeon: Humberto Mitchell MD;  Location: [facility] OR;  Service: Orthopedics;  Laterality: Right;     Current Outpatient Medications on File Prior to Visit   Medication Sig Dispense Refill    acetaminophen (TYLENOL) 500 MG tablet Take 2 tablets (1,000 mg total) by mouth every 8 (eight) hours. 90 tablet 0    albuterol (PROAIR HFA) 90 mcg/actuation inhaler Inhale 2 puffs into the lungs every 6 (six) hours as needed for Shortness of Breath or Wheezing. Rescue 6.7 g 2    albuterol-ipratropium (DUO-NEB) 2.5 mg-0.5 mg/3 mL nebulizer solution Take 3 mLs by nebulization every 6 (six) hours while awake. 270 mL 1    amLODIPine (NORVASC) 5 MG tablet Take 1 tablet (5 mg total) by mouth once daily. 90 tablet 3    atorvastatin (LIPITOR) 20 MG tablet Take 1 tablet (20 mg total) by mouth once daily. 90 tablet 3    carvediloL (COREG) 6.25 MG tablet TAKE 1 TABLET BY MOUTH TWICE DAILY WITH MEALS 180 tablet 1    FLUoxetine 40 MG capsule Take 1 capsule (40 mg total) by mouth once daily. 90  capsule 3    methocarbamoL (ROBAXIN) 500 MG Tab Take 500 mg by mouth 2 (two) times daily as needed.      nebulizer and compressor Sonia 1 each by Misc.(Non-Drug; Combo Route) route every 6 (six) hours while awake. 1 each 0    predniSONE (DELTASONE) 20 MG tablet 2 tablets once daily for 7 days. 14 tablet 0    promethazine-dextromethorphan (PROMETHAZINE-DM) 6.25-15 mg/5 mL Syrp Take 10 mLs by mouth every 6 (six) hours as needed. 240 mL 1    [DISCONTINUED] diclofenac sodium (VOLTAREN) 1 % Gel APPLY 4 GRAMS TOPICALLY FOUR TIMES A DAY AS NEEDED FOR PAIN AND INFLAMMATION. USE ENCLOSED DOSING CARD. BILATERAL KNEE PAIN       No current facility-administered medications on file prior to visit.     Review of patient's allergies indicates:  No Known Allergies  Family History   Problem Relation Age of Onset    Hypertension Mother     Diabetes Father      Social History     Socioeconomic History    Marital status:    Tobacco Use    Smoking status: Never    Smokeless tobacco: Never   Substance and Sexual Activity    Alcohol use: Yes     Alcohol/week: 1.0 standard drink of alcohol     Types: 1 Cans of beer per week     Comment: occasional    Drug use: No    Sexual activity: Yes     Partners: Female     Social Determinants of Health     Financial Resource Strain: Low Risk  (12/4/2023)    Overall Financial Resource Strain (CARDIA)     Difficulty of Paying Living Expenses: Not hard at all   Food Insecurity: No Food Insecurity (12/4/2023)    Hunger Vital Sign     Worried About Running Out of Food in the Last Year: Never true     Ran Out of Food in the Last Year: Never true   Transportation Needs: No Transportation Needs (12/4/2023)    PRAPARE - Transportation     Lack of Transportation (Medical): No     Lack of Transportation (Non-Medical): No   Physical Activity: Inactive (12/4/2023)    Exercise Vital Sign     Days of Exercise per Week: 0 days     Minutes of Exercise per Session: 0 min   Stress: No Stress Concern Present  (12/4/2023)    [employer] [employer] - Occupational Stress Questionnaire     Feeling of Stress : Not at all   Social Connections: Unknown (12/4/2023)    Social Connection and Isolation Panel [NHANES]     Frequency of Communication with Friends and Family: Once a week     Frequency of Social Gatherings with Friends and Family: Once a week     Active Member of Clubs or Organizations: No     Attends Club or Organization Meetings: Never     Marital Status:    Housing Stability: Low Risk  (12/4/2023)    Housing Stability Vital Sign     Unable to Pay for Housing in the Last Year: No     Number of Places Lived in the Last Year: 1     Unstable Housing in the Last Year: No       Review of Systems:  Constitutional:  Denies fever or chills   Eyes:  Denies change in visual acuity   HENT:  Denies nasal congestion or sore throat   Respiratory:  Denies cough or shortness of breath   Cardiovascular:  Denies chest pain or edema   GI:  Denies abdominal pain, nausea, vomiting, bloody stools or diarrhea   :  Denies dysuria   Integument:  Denies rash   Neurologic:  Denies headache, focal weakness or sensory changes   Endocrine:  Denies polyuria or polydipsia   Lymphatic:  Denies swollen glands   Psychiatric:  Denies depression or anxiety     Physical Exam:   Constitutional:  Well developed, well nourished, no acute distress, non-toxic appearance   Integument:  Well hydrated, no rash   Lymphatic:  No lymphadenopathy noted   Neurologic:  Alert & oriented x 3, CN 2-12 normal, normal motor function, normal sensory function, no focal deficits noted   Psychiatric:  Speech and behavior appropriate   Gi: abdomen soft  Eyes: EOMI    R knee  Exam performed same as contralateral side and is normal  L knee  FROM. Patella stable. Small palpable rent noted to superomedial patella. Compartments soft. NVI distally.       Traumatic medial retinacular tear of knee, initial encounter        Will refer to Dr. Guy for [place].  RTC 2 months.

## 2024-05-16 DIAGNOSIS — S86.819A: Primary | ICD-10-CM

## 2024-05-21 DIAGNOSIS — I49.49 PREMATURE BEATS: ICD-10-CM

## 2024-05-21 DIAGNOSIS — I10 ESSENTIAL HYPERTENSION: ICD-10-CM

## 2024-05-22 RX ORDER — CARVEDILOL 6.25 MG/1
6.25 TABLET ORAL 2 TIMES DAILY WITH MEALS
Qty: 180 TABLET | Refills: 0 | Status: SHIPPED | OUTPATIENT
Start: 2024-05-22

## 2024-08-16 DIAGNOSIS — I49.49 PREMATURE BEATS: ICD-10-CM

## 2024-08-16 DIAGNOSIS — I10 ESSENTIAL HYPERTENSION: ICD-10-CM

## 2024-08-16 RX ORDER — CARVEDILOL 6.25 MG/1
6.25 TABLET ORAL 2 TIMES DAILY WITH MEALS
Qty: 180 TABLET | Refills: 0 | Status: SHIPPED | OUTPATIENT
Start: 2024-08-16

## 2025-02-06 ENCOUNTER — OFFICE VISIT (OUTPATIENT)
Dept: ORTHOPEDICS | Facility: CLINIC | Age: 60
End: 2025-02-06
Payer: COMMERCIAL

## 2025-02-06 VITALS — HEIGHT: 70 IN | BODY MASS INDEX: 32.92 KG/M2 | WEIGHT: 229.94 LBS

## 2025-02-06 DIAGNOSIS — M17.0 BILATERAL PRIMARY OSTEOARTHRITIS OF KNEE: Primary | ICD-10-CM

## 2025-02-06 RX ADMIN — TRIAMCINOLONE ACETONIDE 40 MG: 40 INJECTION, SUSPENSION INTRA-ARTICULAR; INTRAMUSCULAR at 08:02

## 2025-02-18 ENCOUNTER — OFFICE VISIT (OUTPATIENT)
Dept: CARDIOLOGY | Facility: CLINIC | Age: 60
End: 2025-02-18
Payer: COMMERCIAL

## 2025-02-18 VITALS
WEIGHT: 241.38 LBS | HEART RATE: 73 BPM | BODY MASS INDEX: 34.56 KG/M2 | HEIGHT: 70 IN | SYSTOLIC BLOOD PRESSURE: 136 MMHG | DIASTOLIC BLOOD PRESSURE: 72 MMHG

## 2025-02-18 DIAGNOSIS — I34.0 NONRHEUMATIC MITRAL VALVE REGURGITATION: Primary | Chronic | ICD-10-CM

## 2025-02-18 DIAGNOSIS — I10 ESSENTIAL HYPERTENSION: Chronic | ICD-10-CM

## 2025-02-18 DIAGNOSIS — E78.49 OTHER HYPERLIPIDEMIA: Chronic | ICD-10-CM

## 2025-02-18 DIAGNOSIS — E66.811 OBESITY, CLASS I, BMI 30-34.9: Chronic | ICD-10-CM

## 2025-02-18 DIAGNOSIS — I77.810 ASCENDING AORTA DILATATION: Chronic | ICD-10-CM

## 2025-02-18 RX ORDER — METOPROLOL SUCCINATE 25 MG/1
25 TABLET, EXTENDED RELEASE ORAL DAILY
Qty: 90 TABLET | Refills: 1 | Status: SHIPPED | OUTPATIENT
Start: 2025-02-18 | End: 2026-02-18

## 2025-02-18 RX ORDER — TRIAMCINOLONE ACETONIDE 40 MG/ML
40 INJECTION, SUSPENSION INTRA-ARTICULAR; INTRAMUSCULAR
Status: DISCONTINUED | OUTPATIENT
Start: 2025-02-06 | End: 2025-02-18 | Stop reason: HOSPADM

## 2025-02-18 NOTE — PROGRESS NOTES
Chief Complaint   Patient presents with    Left Knee - Pain    Right Lower Leg - Pain, Numbness         HPI:   This is a 59 y.o. who presents to clinic today complaining of left knee pain for 4 decades after undergoing training in the  and crawling, stooping, and kneeling for prolonged periods. Pain is progressively worsening. No numbness or tingling. No associated signs or symptoms.    Past Medical History:   Diagnosis Date    Allergy     Hyperlipemia     Hypertension      Past Surgical History:   Procedure Laterality Date    COLONOSCOPY  02/22/2019    Dr Lofton, received report     EPIDURAL STEROID INJECTION INTO LUMBAR SPINE N/A 9/28/2022    Procedure: Injection-steroid-epidural-lumbar L5/S1 to right;  Surgeon: Benny Pritchard MD;  Location: [facility] OR;  Service: Pain Management;  Laterality: N/A;    HERNIA REPAIR      REPAIR, RETINACULUM, KNEE Right 12/18/2023    Procedure: REPAIR, RETINACULUM, KNEE;  Surgeon: Humberto Mitchell MD;  Location: [facility] OR;  Service: Orthopedics;  Laterality: Right;    ROBOTIC ARTHROPLASTY, KNEE Right 7/24/2023    Procedure: ROBOTIC ARTHROPLASTY, KNEE, TOTAL - Kiet;  Surgeon: Humberto Mitchell MD;  Location: [facility] OR;  Service: Orthopedics;  Laterality: Right;     Medications Ordered Prior to Encounter[1]  Review of patient's allergies indicates:  No Known Allergies  Family History   Problem Relation Name Age of Onset    Hypertension Mother      Diabetes Father       Social History[2]    Review of Systems:  Constitutional:  Denies fever or chills   Eyes:  Denies change in visual acuity   HENT:  Denies nasal congestion or sore throat   Respiratory:  Denies cough or shortness of breath   Cardiovascular:  Denies chest pain or edema   GI:  Denies abdominal pain, nausea, vomiting, bloody stools or diarrhea   :  Denies dysuria   Integument:  Denies rash   Neurologic:  Denies headache, focal weakness or sensory changes   Endocrine:  Denies polyuria or polydipsia   Lymphatic:  Denies  swollen glands   Psychiatric:  Denies depression or anxiety     Physical Exam:   Constitutional:  Well developed, well nourished, no acute distress, non-toxic appearance   Integument:  Well hydrated, no rash   Lymphatic:  No lymphadenopathy noted   Neurologic:  Alert & oriented x 3, CN 2-12 normal, normal motor function, normal sensory function, no focal deficits noted   Psychiatric:  Speech and behavior appropriate   Eyes: EOMI  Gi: abdomen soft    Bilateral Knee Exam    left Knee Exam     Tenderness   The patient is experiencing tenderness in the medial joint line.    Range of Motion   Extension: abnormal   Flexion: abnormal     Muscle Strength     The patient has normal knee strength.    Tests   Angelina:  Medial - positive   Lachman:  Anterior - negative      Varus: negative  Valgus: negative  Patellar Apprehension: negative    Other   Erythema: absent  Sensation: normal  Pulse: present  Swelling: mild      right Knee Exam   right knee exam performed same as contralateral side and is normal.            X-rays were performed, personally reviewed by me and findings discussed with the patient.  3 views of the left knee show tricompartmental degenerative change most pronounced in the medial compartment with Kellgren 3 changes    Bilateral primary osteoarthritis of knee            Using an aseptic technique, I injected 5 cc of lidocaine 1% without and 1 cc of kenalog 40mg into the left Knee. The patient tolerated this well. I will have them return to clinic in 3 months. I had a long discussion with him that he will likely need a left knee replacement due to the wear and tear from his career in the .              [1]   Current Outpatient Medications on File Prior to Visit   Medication Sig Dispense Refill    amLODIPine (NORVASC) 5 MG tablet Take 1 tablet (5 mg total) by mouth once daily. 90 tablet 3    atorvastatin (LIPITOR) 20 MG tablet Take 1 tablet (20 mg total) by mouth once daily. 90 tablet 3    FLUoxetine  40 MG capsule Take 1 capsule (40 mg total) by mouth once daily. 90 capsule 3    methocarbamoL (ROBAXIN) 500 MG Tab Take 500 mg by mouth 2 (two) times daily as needed.      nebulizer and compressor Sonia 1 each by Misc.(Non-Drug; Combo Route) route every 6 (six) hours while awake. 1 each 0    [DISCONTINUED] acetaminophen (TYLENOL) 500 MG tablet Take 2 tablets (1,000 mg total) by mouth every 8 (eight) hours. 90 tablet 0    [DISCONTINUED] albuterol (PROAIR HFA) 90 mcg/actuation inhaler Inhale 2 puffs into the lungs every 6 (six) hours as needed for Shortness of Breath or Wheezing. Rescue 6.7 g 2    [DISCONTINUED] albuterol-ipratropium (DUO-NEB) 2.5 mg-0.5 mg/3 mL nebulizer solution Take 3 mLs by nebulization every 6 (six) hours while awake. 270 mL 1    [DISCONTINUED] carvediloL (COREG) 6.25 MG tablet TAKE 1 TABLET BY MOUTH TWICE DAILY WITH MEALS 180 tablet 0    [DISCONTINUED] diclofenac sodium (VOLTAREN) 1 % Gel APPLY 4 GRAMS TOPICALLY FOUR TIMES A DAY AS NEEDED FOR PAIN AND INFLAMMATION. USE ENCLOSED DOSING CARD. BILATERAL KNEE PAIN       No current facility-administered medications on file prior to visit.   [2]   Social History  Socioeconomic History    Marital status:    Tobacco Use    Smoking status: Never    Smokeless tobacco: Never   Substance and Sexual Activity    Alcohol use: Yes     Alcohol/week: 1.0 standard drink of alcohol     Types: 1 Cans of beer per week     Comment: occasional    Drug use: No    Sexual activity: Yes     Partners: Female     Social Drivers of Health     Financial Resource Strain: Low Risk  (2/5/2025)    Overall Financial Resource Strain (CARDIA)     Difficulty of Paying Living Expenses: Not hard at all   Food Insecurity: No Food Insecurity (2/5/2025)    Hunger Vital Sign     Worried About Running Out of Food in the Last Year: Never true     Ran Out of Food in the Last Year: Never true   Transportation Needs: No Transportation Needs (12/4/2023)    PRAPARE - Transportation     Lack  of Transportation (Medical): No     Lack of Transportation (Non-Medical): No   Physical Activity: Insufficiently Active (2/5/2025)    Exercise Vital Sign     Days of Exercise per Week: 2 days     Minutes of Exercise per Session: 30 min   Stress: Stress Concern Present (2/5/2025)    [employer] [place] [employer] - Occupational Stress Questionnaire     Feeling of Stress : To some extent   Housing Stability: Low Risk  (12/4/2023)    Housing Stability Vital Sign     Unable to Pay for Housing in the Last Year: No     Number of Places Lived in the Last Year: 1     Unstable Housing in the Last Year: No

## 2025-02-18 NOTE — PROGRESS NOTES
Subjective:    Patient ID:  Bob Farmer is a 59 y.o. male who presents for Nonrheumatic mitral valve regurgitation (F/u)        HPI  ONLY RECENT LABS FLU AND COVID TESTS WERE NEGATIVE, DOING WELL, HAS BEEN TAKING CARVEDILOL ONCE, FORGETS, SEE ROS    Past Medical History:   Diagnosis Date    Allergy     Hyperlipemia     Hypertension      Past Surgical History:   Procedure Laterality Date    COLONOSCOPY  02/22/2019    Dr Lofton, received report     EPIDURAL STEROID INJECTION INTO LUMBAR SPINE N/A 9/28/2022    Procedure: Injection-steroid-epidural-lumbar L5/S1 to right;  Surgeon: Benny Pritchard MD;  Location: [facility] OR;  Service: Pain Management;  Laterality: N/A;    HERNIA REPAIR      REPAIR, RETINACULUM, KNEE Right 12/18/2023    Procedure: REPAIR, RETINACULUM, KNEE;  Surgeon: Humberto Mitchell MD;  Location: [facility] OR;  Service: Orthopedics;  Laterality: Right;    ROBOTIC ARTHROPLASTY, KNEE Right 7/24/2023    Procedure: ROBOTIC ARTHROPLASTY, KNEE, TOTAL - Kiet;  Surgeon: Humberto Mitchell MD;  Location: [facility] OR;  Service: Orthopedics;  Laterality: Right;     Family History   Problem Relation Name Age of Onset    Hypertension Mother      Diabetes Father       Social History     Socioeconomic History    Marital status:    Tobacco Use    Smoking status: Never    Smokeless tobacco: Never   Substance and Sexual Activity    Alcohol use: Yes     Alcohol/week: 1.0 standard drink of alcohol     Types: 1 Cans of beer per week     Comment: occasional    Drug use: No    Sexual activity: Yes     Partners: Female     Social Drivers of Health     Financial Resource Strain: Low Risk  (2/5/2025)    Overall Financial Resource Strain (CARDIA)     Difficulty of Paying Living Expenses: Not hard at all   Food Insecurity: No Food Insecurity (2/5/2025)    Hunger Vital Sign     Worried About Running Out of Food in the Last Year: Never true     Ran Out of Food in the Last Year: Never true   Transportation Needs: No Transportation  Needs (12/4/2023)    PRAPARE - Transportation     Lack of Transportation (Medical): No     Lack of Transportation (Non-Medical): No   Physical Activity: Insufficiently Active (2/5/2025)    Exercise Vital Sign     Days of Exercise per Week: 2 days     Minutes of Exercise per Session: 30 min   Stress: Stress Concern Present (2/5/2025)    [employer] - Occupational Stress Questionnaire     Feeling of Stress : To some extent   Housing Stability: Low Risk  (12/4/2023)    Housing Stability Vital Sign     Unable to Pay for Housing in the Last Year: No     Number of Places Lived in the Last Year: 1     Unstable Housing in the Last Year: No       Review of patient's allergies indicates:  No Known Allergies  Current Medications[1]    Review of Systems   Constitutional: Negative for chills, diaphoresis, fever, malaise/fatigue and night sweats.   HENT:  Negative for congestion and sore throat.    Eyes:  Negative for blurred vision and visual disturbance.   Cardiovascular:  Negative for chest pain, claudication, cyanosis, dyspnea on exertion (MILD,), irregular heartbeat, leg swelling, near-syncope, orthopnea, palpitations, paroxysmal nocturnal dyspnea and syncope.   Respiratory:  Negative for cough, hemoptysis, shortness of breath and wheezing.    Endocrine: Negative.  Negative for polyphagia and polyuria.   Hematologic/Lymphatic: Negative for adenopathy. Does not bruise/bleed easily.   Skin:  Negative for color change and itching.   Musculoskeletal:  Positive for joint pain (KNEES). Negative for back pain and falls.   Gastrointestinal:  Negative for abdominal pain, change in bowel habit, dysphagia, jaundice, melena and nausea.   Genitourinary:  Negative for dysuria and flank pain.   Neurological:  Negative for brief paralysis, dizziness, focal weakness, loss of balance, numbness and weakness.   Psychiatric/Behavioral:  Negative for altered mental status and depression.         Objective:     "  Vitals:    02/18/25 1549 02/18/25 1600   BP: 137/77 136/72   Pulse: 73    Weight: 109.5 kg (241 lb 6.5 oz)    Height: 5' 10" (1.778 m)    PainSc: 0-No pain      Body mass index is 34.64 kg/m².    Physical Exam  Constitutional:       Appearance: Normal appearance. He is well-developed. He is obese.   HENT:      Head: Normocephalic and atraumatic.   Eyes:      Extraocular Movements: Extraocular movements intact.      Conjunctiva/sclera: Conjunctivae normal.      Pupils: Pupils are equal, round, and reactive to light.   Neck:      Vascular: No carotid bruit or JVD.   Cardiovascular:      Rate and Rhythm: Normal rate and regular rhythm. No extrasystoles are present.     Pulses:           Carotid pulses are 2+ on the right side and 2+ on the left side.       Radial pulses are 2+ on the right side and 2+ on the left side.        Posterior tibial pulses are 2+ on the right side and 2+ on the left side.      Heart sounds: Murmur heard.      Systolic murmur is present with a grade of 1/6 at the lower left sternal border.      No friction rub. No gallop.   Pulmonary:      Effort: Pulmonary effort is normal.      Breath sounds: Normal breath sounds and air entry. No rales.   Abdominal:      Palpations: Abdomen is soft. There is no hepatomegaly.      Tenderness: There is no abdominal tenderness.      Hernia: A hernia is present. There is no hernia in the umbilical area.   Musculoskeletal:         General: No deformity. Normal range of motion.      Cervical back: Neck supple.   Skin:     General: Skin is warm and dry.      Capillary Refill: Capillary refill takes less than 2 seconds.   Neurological:      General: No focal deficit present.      Mental Status: He is alert and oriented to person, place, and time.      Cranial Nerves: Cranial nerves 2-12 are intact.   Psychiatric:         Mood and Affect: Mood normal.         Speech: Speech normal.         Behavior: Behavior normal.                 ..    Chemistry        Component " Value Date/Time     07/11/2023 0919     08/01/2015 0730    K 4.7 07/11/2023 0919    K 4.9 08/01/2015 0730     07/11/2023 0919     08/01/2015 0730    CO2 28 07/11/2023 0919    BUN 23 (H) 07/11/2023 0919    CREATININE 0.89 07/11/2023 0919    CREATININE 1.01 08/01/2015 0730    GLU 91 07/11/2023 0919        Component Value Date/Time    CALCIUM 8.7 07/11/2023 0919    ALKPHOS 118 02/13/2023 0853    AST 22 02/13/2023 0853    ALT 30 02/13/2023 0853    BILITOT 0.6 02/13/2023 0853    ESTGFRAFRICA >60 02/02/2021 1155    EGFRNONAA >60 02/02/2021 1155            ..  Lab Results   Component Value Date    CHOL 193 02/13/2023    CHOL 184 08/14/2020    CHOL 201 (H) 08/10/2019     Lab Results   Component Value Date    HDL 43 02/13/2023    HDL 47 08/14/2020    HDL 49 08/10/2019     Lab Results   Component Value Date    LDLCALC 117.6 02/13/2023    LDLCALC 116 (H) 08/14/2020    LDLCALC 132 (H) 08/10/2019     Lab Results   Component Value Date    TRIG 162 (H) 02/13/2023    TRIG 105 08/14/2020    TRIG 98 08/10/2019     Lab Results   Component Value Date    CHOLHDL 22.3 02/13/2023    CHOLHDL 4.1 08/10/2019    CHOLHDL 4.0 02/02/2019     ..  Lab Results   Component Value Date    WBC 6.34 07/11/2023    HGB 15.0 07/11/2023    HCT 41.6 07/11/2023    MCV 88 07/11/2023     07/11/2023       Test(s) Reviewed  I have reviewed the following in detail:  [] Stress test   [] Angiography   [] Echocardiogram   [x] Labs   [] Other:       Assessment:         ICD-10-CM ICD-9-CM   1. Nonrheumatic mitral valve regurgitation  I34.0 424.0   2. Essential hypertension  I10 401.9   3. Ascending aorta dilatation  I77.810 447.71   4. Other hyperlipidemia  E78.49 272.4   5. Obesity, Class I, BMI 30-34.9  E66.811 278.00     Problem List Items Addressed This Visit          Cardiac/Vascular    Essential hypertension    Relevant Orders    Comprehensive Metabolic Panel    Magnesium    CV Ultrasound Bilateral Doppler Carotid    Nonrheumatic  mitral valve regurgitation - Primary    Ascending aorta dilatation    Hyperlipidemia    Relevant Orders    Comprehensive Metabolic Panel    Lipid Panel    CV Ultrasound Bilateral Doppler Carotid       Endocrine    Obesity, Class I, BMI 30-34.9        Plan:     CHANGE COREG TO TOPROL, BECAUSE OF ISSUE WITH B.I.D. COMPLIANCE,CAROTID US, SCREENING RETURN TO CLINIC IN  6 MO WITH LABS    ALL CV CLINICALLY STABLE, NO ANGINA, NO HF, NO TIA, NO CLINICAL ARRHYTHMIA,CONTINUE CURRENT MEDS, EDUCATION, DIET, EXERCISE , WEIGHT LOSS        Nonrheumatic mitral valve regurgitation    Essential hypertension  -     Comprehensive Metabolic Panel; Future; Expected date: 02/18/2025  -     Magnesium; Future; Expected date: 02/18/2025  -     CV Ultrasound Bilateral Doppler Carotid; Future    Ascending aorta dilatation    Other hyperlipidemia  -     Comprehensive Metabolic Panel; Future; Expected date: 02/18/2025  -     Lipid Panel; Future; Expected date: 02/18/2025  -     CV Ultrasound Bilateral Doppler Carotid; Future    Obesity, Class I, BMI 30-34.9    Other orders  -     metoprolol succinate (TOPROL-XL) 25 MG 24 hr tablet; Take 1 tablet (25 mg total) by mouth once daily.  Dispense: 90 tablet; Refill: 1    RTC Low level/low impact aerobic exercise 5x's/wk. Heart healthy diet and risk factor modification.    See labs and med orders.    Aerobic exercise 5x's/wk. Heart healthy diet and risk factor modification.    See labs and med orders.             [1]   Current Outpatient Medications:     amLODIPine (NORVASC) 5 MG tablet, Take 1 tablet (5 mg total) by mouth once daily., Disp: 90 tablet, Rfl: 3    atorvastatin (LIPITOR) 20 MG tablet, Take 1 tablet (20 mg total) by mouth once daily., Disp: 90 tablet, Rfl: 3    FLUoxetine 40 MG capsule, Take 1 capsule (40 mg total) by mouth once daily., Disp: 90 capsule, Rfl: 3    methocarbamoL (ROBAXIN) 500 MG Tab, Take 500 mg by mouth 2 (two) times daily as needed., Disp: , Rfl:     metoprolol succinate  (TOPROL-XL) 25 MG 24 hr tablet, Take 1 tablet (25 mg total) by mouth once daily., Disp: 90 tablet, Rfl: 1    nebulizer and compressor Sonia, 1 each by Misc.(Non-Drug; Combo Route) route every 6 (six) hours while awake., Disp: 1 each, Rfl: 0  No current facility-administered medications for this visit.

## 2025-02-18 NOTE — PROCEDURES
Large Joint Aspiration/Injection: L knee    Date/Time: 2/6/2025 8:45 AM    Performed by: Humberto Mitchell MD  Authorized by: Humberto Mitchell MD    Consent Done?:  Yes (Verbal)  Indications:  Pain  Timeout: prior to procedure the correct patient, procedure, and site was verified    Prep: patient was prepped and draped in usual sterile fashion    Local anesthetic:  Lidocaine 1% without epinephrine  Anesthetic total (ml):  5      Details:  Needle Size:  21 G  Approach:  Anterolateral  Location:  Knee  Site:  L knee  Medications:  40 mg triamcinolone acetonide 40 mg/mL  Patient tolerance:  Patient tolerated the procedure well with no immediate complications

## 2025-02-24 ENCOUNTER — HOSPITAL ENCOUNTER (OUTPATIENT)
Dept: CARDIOLOGY | Facility: HOSPITAL | Age: 60
Discharge: HOME | End: 2025-02-24
Attending: INTERNAL MEDICINE
Payer: COMMERCIAL

## 2025-02-24 DIAGNOSIS — E78.49 OTHER HYPERLIPIDEMIA: Chronic | ICD-10-CM

## 2025-02-24 DIAGNOSIS — I10 ESSENTIAL HYPERTENSION: Chronic | ICD-10-CM

## 2025-02-24 LAB
LEFT ARM DIASTOLIC BLOOD PRESSURE: 72 MMHG
LEFT ARM SYSTOLIC BLOOD PRESSURE: 136 MMHG
LEFT CBA DIAS: 22 CM/S
LEFT CBA SYS: 104 CM/S
LEFT CCA DIST DIAS: 18 CM/S
LEFT CCA DIST SYS: 84 CM/S
LEFT CCA MID DIAS: 26 CM/S
LEFT CCA MID SYS: 129 CM/S
LEFT CCA PROX DIAS: 19 CM/S
LEFT CCA PROX SYS: 116 CM/S
LEFT ECA DIAS: 11 CM/S
LEFT ECA SYS: 54 CM/S
LEFT ICA DIST DIAS: 27 CM/S
LEFT ICA DIST SYS: 84 CM/S
LEFT ICA MID DIAS: 24 CM/S
LEFT ICA MID SYS: 91 CM/S
LEFT ICA PROX DIAS: 13 CM/S
LEFT ICA PROX SYS: 79 CM/S
LEFT VERTEBRAL DIAS: 10 CM/S
LEFT VERTEBRAL SYS: 38 CM/S
OHS CV CAROTID RIGHT ICA EDV HIGHEST: 27
OHS CV CAROTID ULTRASOUND LEFT ICA/CCA RATIO: 1.08
OHS CV CAROTID ULTRASOUND RIGHT ICA/CCA RATIO: 0.97
OHS CV PV CAROTID LEFT HIGHEST CCA: 129
OHS CV PV CAROTID LEFT HIGHEST ICA: 91
OHS CV PV CAROTID RIGHT HIGHEST CCA: 101
OHS CV PV CAROTID RIGHT HIGHEST ICA: 77
OHS CV US CAROTID LEFT HIGHEST EDV: 27
RIGHT ARM DIASTOLIC BLOOD PRESSURE: 72 MMHG
RIGHT ARM SYSTOLIC BLOOD PRESSURE: 136 MMHG
RIGHT CBA DIAS: 23 CM/S
RIGHT CBA SYS: 93 CM/S
RIGHT CCA DIST DIAS: 18 CM/S
RIGHT CCA DIST SYS: 79 CM/S
RIGHT CCA MID DIAS: 16 CM/S
RIGHT CCA MID SYS: 101 CM/S
RIGHT CCA PROX DIAS: 19 CM/S
RIGHT CCA PROX SYS: 101 CM/S
RIGHT ECA DIAS: 18 CM/S
RIGHT ECA SYS: 123 CM/S
RIGHT ICA DIST DIAS: 23 CM/S
RIGHT ICA DIST SYS: 75 CM/S
RIGHT ICA MID DIAS: 27 CM/S
RIGHT ICA MID SYS: 77 CM/S
RIGHT ICA PROX DIAS: 8 CM/S
RIGHT ICA PROX SYS: 42 CM/S
RIGHT VERTEBRAL DIAS: 8 CM/S
RIGHT VERTEBRAL SYS: 34 CM/S

## 2025-02-24 PROCEDURE — 93880 EXTRACRANIAL BILAT STUDY: CPT | Mod: 26,,, | Performed by: INTERNAL MEDICINE

## 2025-02-24 PROCEDURE — 93880 EXTRACRANIAL BILAT STUDY: CPT | Mod: PO

## 2025-02-25 ENCOUNTER — TELEPHONE (OUTPATIENT)
Dept: ORTHOPEDICS | Facility: CLINIC | Age: 60
End: 2025-02-25
Payer: COMMERCIAL

## 2025-02-25 NOTE — TELEPHONE ENCOUNTER
----- Message from Randy sent at 2/24/2025  2:32 PM CST -----  Regarding: return call  Contact: patient  Type:  Patient Returning CallWho Called:patientWho Left Message for Patient:nurseDoes the patient know what this is regarding?:remind MD about writing a note/ Report for VAWould the patient rather a call back or a response via [facility]? Status of noteBest Call Back Number:[phone] Information:

## 2025-04-16 DIAGNOSIS — I10 ESSENTIAL HYPERTENSION: Primary | ICD-10-CM

## 2025-04-16 RX ORDER — METOPROLOL SUCCINATE 25 MG/1
25 TABLET, EXTENDED RELEASE ORAL DAILY
Qty: 90 TABLET | Refills: 1 | Status: SHIPPED | OUTPATIENT
Start: 2025-04-16 | End: 2026-04-16

## 2025-04-21 DIAGNOSIS — M25.511 ACUTE PAIN OF RIGHT SHOULDER: Primary | ICD-10-CM

## 2025-05-01 ENCOUNTER — HOSPITAL ENCOUNTER (OUTPATIENT)
Dept: RADIOLOGY | Facility: HOSPITAL | Age: 60
Discharge: HOME | End: 2025-05-01
Attending: ORTHOPAEDIC SURGERY
Payer: COMMERCIAL

## 2025-05-01 ENCOUNTER — OFFICE VISIT (OUTPATIENT)
Dept: ORTHOPEDICS | Facility: CLINIC | Age: 60
End: 2025-05-01
Payer: COMMERCIAL

## 2025-05-01 VITALS — HEIGHT: 70 IN | BODY MASS INDEX: 34.56 KG/M2 | WEIGHT: 241.38 LBS

## 2025-05-01 DIAGNOSIS — M25.511 ACUTE PAIN OF RIGHT SHOULDER: ICD-10-CM

## 2025-05-01 DIAGNOSIS — M75.41 IMPINGEMENT SYNDROME, SHOULDER, RIGHT: ICD-10-CM

## 2025-05-01 DIAGNOSIS — M75.21 BICEPS TENDINITIS OF RIGHT UPPER EXTREMITY: ICD-10-CM

## 2025-05-01 DIAGNOSIS — M17.12 PRIMARY OSTEOARTHRITIS OF LEFT KNEE: Primary | ICD-10-CM

## 2025-05-01 PROCEDURE — 73030 X-RAY EXAM OF SHOULDER: CPT | Mod: 26,RT,, | Performed by: RADIOLOGY

## 2025-05-01 PROCEDURE — 99214 OFFICE O/P EST MOD 30 MIN: CPT | Mod: 25,S$GLB,, | Performed by: ORTHOPAEDIC SURGERY

## 2025-05-01 PROCEDURE — 1160F RVW MEDS BY RX/DR IN RCRD: CPT | Mod: CPTII,S$GLB,, | Performed by: ORTHOPAEDIC SURGERY

## 2025-05-01 PROCEDURE — 99999 PR PBB SHADOW E&M-EST. PATIENT-LVL III: CPT | Mod: PBBFAC,,, | Performed by: ORTHOPAEDIC SURGERY

## 2025-05-01 PROCEDURE — 20610 DRAIN/INJ JOINT/BURSA W/O US: CPT | Mod: 50,S$GLB,, | Performed by: ORTHOPAEDIC SURGERY

## 2025-05-01 PROCEDURE — 73030 X-RAY EXAM OF SHOULDER: CPT | Mod: TC,PO,RT

## 2025-05-01 PROCEDURE — 1159F MED LIST DOCD IN RCRD: CPT | Mod: CPTII,S$GLB,, | Performed by: ORTHOPAEDIC SURGERY

## 2025-05-01 PROCEDURE — 3008F BODY MASS INDEX DOCD: CPT | Mod: CPTII,S$GLB,, | Performed by: ORTHOPAEDIC SURGERY

## 2025-05-01 RX ORDER — AMLODIPINE BESYLATE 5 MG/1
1 TABLET ORAL DAILY
COMMUNITY

## 2025-05-01 RX ADMIN — TRIAMCINOLONE ACETONIDE 40 MG: 40 INJECTION, SUSPENSION INTRA-ARTICULAR; INTRAMUSCULAR at 09:05

## 2025-05-08 RX ORDER — TRIAMCINOLONE ACETONIDE 40 MG/ML
40 INJECTION, SUSPENSION INTRA-ARTICULAR; INTRAMUSCULAR
Status: DISCONTINUED | OUTPATIENT
Start: 2025-05-01 | End: 2025-05-08 | Stop reason: HOSPADM

## 2025-05-08 NOTE — PROCEDURES
Large Joint Aspiration/Injection: L knee    Date/Time: 5/1/2025 9:30 AM    Performed by: Humberto Mitchell MD  Authorized by: Humberto Mitchell MD    Consent Done?:  Yes (Verbal)  Indications:  Pain  Timeout: prior to procedure the correct patient, procedure, and site was verified    Prep: patient was prepped and draped in usual sterile fashion    Local anesthetic:  Lidocaine 1% without epinephrine  Anesthetic total (ml):  5      Details:  Needle Size:  21 G  Approach:  Anterolateral  Location:  Knee  Site:  L knee  Medications:  40 mg triamcinolone acetonide 40 mg/mL  Patient tolerance:  Patient tolerated the procedure well with no immediate complications  Large Joint Aspiration/Injection: R subacromial bursa    Date/Time: 5/1/2025 9:30 AM    Performed by: Humberto Mitchell MD  Authorized by: Humberto Mitchell MD    Consent Done?:  Yes (Verbal)  Indications:  Pain  Timeout: prior to procedure the correct patient, procedure, and site was verified    Prep: patient was prepped and draped in usual sterile fashion      Local anesthesia used?: Yes    Local anesthetic:  Lidocaine 1% without epinephrine  Anesthetic total (ml):  5      Details:  Needle Size:  22 G  Ultrasonic Guidance for needle placement?: No    Approach:  Posterior  Location:  Shoulder  Site:  R subacromial bursa  Medications:  40 mg triamcinolone acetonide 40 mg/mL  Patient tolerance:  Patient tolerated the procedure well with no immediate complications  Tendon Sheath    Date/Time: 5/1/2025 9:30 AM    Performed by: Humberto Mitchell MD  Authorized by: Humberto Mitchell MD    Consent Done?:  Yes (Verbal)  Indications:  Pain  Timeout: prior to procedure the correct patient, procedure, and site was verified    Local anesthesia used?: No    Location:  Shoulder  Site:  R bicep tendon  Ultrasonic guidance for needle placement?: No    Needle size:  25 G  Medications:  40 mg triamcinolone acetonide 40 mg/mL  Patient tolerance:  Patient tolerated the procedure well  with no immediate complications

## 2025-05-08 NOTE — PROGRESS NOTES
Chief Complaint   Patient presents with    Right Shoulder - Pain, Numbness     Some numbness when asleep, pain 7-8/10 when raised         HPI:   This is a 60 y.o. who presents to clinic today complaining of left knee and right shoulder pain for 2 weeks after no known trauma. Pain is progressively worsening. No numbness or tingling. No associated signs or symptoms.    Past Medical History:   Diagnosis Date    Allergy     Hyperlipemia     Hypertension      Past Surgical History:   Procedure Laterality Date    COLONOSCOPY  02/22/2019    Dr Lofton, received report     EPIDURAL STEROID INJECTION INTO LUMBAR SPINE N/A 9/28/2022    Procedure: Injection-steroid-epidural-lumbar L5/S1 to right;  Surgeon: Benny Pritchard MD;  Location: [facility] OR;  Service: Pain Management;  Laterality: N/A;    HERNIA REPAIR      REPAIR, RETINACULUM, KNEE Right 12/18/2023    Procedure: REPAIR, RETINACULUM, KNEE;  Surgeon: Humberto Mitchell MD;  Location: [facility] OR;  Service: Orthopedics;  Laterality: Right;    ROBOTIC ARTHROPLASTY, KNEE Right 7/24/2023    Procedure: ROBOTIC ARTHROPLASTY, KNEE, TOTAL - Kiet;  Surgeon: Humberto Mitchell MD;  Location: [facility] OR;  Service: Orthopedics;  Laterality: Right;     Medications Ordered Prior to Encounter[1]  Review of patient's allergies indicates:  No Known Allergies  Family History   Problem Relation Name Age of Onset    Hypertension Mother      Diabetes Father       Social History[2]    Review of Systems:  Constitutional:  Denies fever or chills   Eyes:  Denies change in visual acuity   HENT:  Denies nasal congestion or sore throat   Respiratory:  Denies cough or shortness of breath   Cardiovascular:  Denies chest pain or edema   GI:  Denies abdominal pain, nausea, vomiting, bloody stools or diarrhea   :  Denies dysuria   Integument:  Denies rash   Neurologic:  Denies headache, focal weakness or sensory changes   Endocrine:  Denies polyuria or polydipsia   Lymphatic:  Denies swollen glands   Psychiatric:   Denies depression or anxiety     Physical Exam:   Constitutional:  Well developed, well nourished, no acute distress, non-toxic appearance   Integument:  Well hydrated, no rash   Lymphatic:  No lymphadenopathy noted   Neurologic:  Alert & oriented x 3, CN 2-12 normal, normal motor function, normal sensory function, no focal deficits noted   Psychiatric:  Speech and behavior appropriate   Eyes: EOMI  Gi: abdomen soft    Bilateral Knee Exam    left Knee Exam     Tenderness   The patient is experiencing tenderness in the medial joint line.    Range of Motion   Extension: abnormal   Flexion: abnormal     Muscle Strength     The patient has normal knee strength.    Tests   Angelina:  Medial - positive   Lachman:  Anterior - negative      Varus: negative  Valgus: negative  Patellar Apprehension: negative    Other   Erythema: absent  Sensation: normal  Pulse: present  Swelling: mild      right Knee Exam   right knee exam performed same as contralateral side and is normal.      Bilateral Shoulder Exam    left Shoulder Exam   Shoulder exam performed same as contralateral side and is normal.    right Shoulder Exam   Tenderness   Shoulder tenderness location: diffusely about shoulder.    Range of Motion   Forward Flexion: abnormal   External Rotation: abnormal     Muscle Strength   Supraspinatus: 4/5     Tests   Hawkin's test: positive  Impingement: positive    Other   Erythema: absent  Sensation: normal  Pulse: present           X-rays were performed, personally reviewed by me and findings discussed with the patient.  3 views of the left knee show tricompartmental degenerative change most pronounced in the medial compartment with Kellgren 3 changes    Primary osteoarthritis of left knee    Impingement syndrome, shoulder, right    Biceps tendinitis of right upper extremity            Using an aseptic technique, I injected 5 cc of lidocaine 1% without and 1 cc of kenalog 40mg into the left Knee and right shoulder and 1:1 in the  right biceps. The patient tolerated this well. I will have them return to clinic in 6 weeks.             [1]   Current Outpatient Medications on File Prior to Visit   Medication Sig Dispense Refill    amLODIPine (NORVASC) 5 MG tablet Take 1 tablet by mouth once daily 90 tablet 0    atorvastatin (LIPITOR) 20 MG tablet Take 1 tablet by mouth once daily 90 tablet 0    FLUoxetine 40 MG capsule Take 1 capsule by mouth once daily 90 capsule 0    metoprolol succinate (TOPROL-XL) 25 MG 24 hr tablet Take 1 tablet (25 mg total) by mouth once daily. 90 tablet 1    amLODIPine (NORVASC) 5 MG tablet Take 1 tablet by mouth once daily. (Patient not taking: Reported on 5/1/2025)      methocarbamoL (ROBAXIN) 500 MG Tab Take 500 mg by mouth 2 (two) times daily as needed.      nebulizer and compressor Sonia 1 each by Misc.(Non-Drug; Combo Route) route every 6 (six) hours while awake. 1 each 0    [DISCONTINUED] diclofenac sodium (VOLTAREN) 1 % Gel APPLY 4 GRAMS TOPICALLY FOUR TIMES A DAY AS NEEDED FOR PAIN AND INFLAMMATION. USE ENCLOSED DOSING CARD. BILATERAL KNEE PAIN       No current facility-administered medications on file prior to visit.   [2]   Social History  Socioeconomic History    Marital status:    Tobacco Use    Smoking status: Never    Smokeless tobacco: Never   Substance and Sexual Activity    Alcohol use: Yes     Alcohol/week: 1.0 standard drink of alcohol     Types: 1 Cans of beer per week     Comment: occasional    Drug use: No    Sexual activity: Yes     Partners: Female     Social Drivers of Health     Financial Resource Strain: Low Risk  (2/5/2025)    Overall Financial Resource Strain (CARDIA)     Difficulty of Paying Living Expenses: Not hard at all   Food Insecurity: No Food Insecurity (2/5/2025)    Hunger Vital Sign     Worried About Running Out of Food in the Last Year: Never true     Ran Out of Food in the Last Year: Never true   Transportation Needs: No Transportation Needs (12/4/2023)    PRAPARE -  Transportation     Lack of Transportation (Medical): No     Lack of Transportation (Non-Medical): No   Physical Activity: Insufficiently Active (2/5/2025)    Exercise Vital Sign     Days of Exercise per Week: 2 days     Minutes of Exercise per Session: 30 min   Stress: Stress Concern Present (2/5/2025)    [employer] - Occupational Stress Questionnaire     Feeling of Stress : To some extent   Housing Stability: Low Risk  (12/4/2023)    Housing Stability Vital Sign     Unable to Pay for Housing in the Last Year: No     Number of Places Lived in the Last Year: 1     Unstable Housing in the Last Year: No

## 2025-05-20 ENCOUNTER — PATIENT MESSAGE (OUTPATIENT)
Dept: ORTHOPEDICS | Facility: CLINIC | Age: 60
End: 2025-05-20
Payer: COMMERCIAL

## 2025-06-10 ENCOUNTER — PATIENT MESSAGE (OUTPATIENT)
Dept: ORTHOPEDICS | Facility: CLINIC | Age: 60
End: 2025-06-10
Payer: COMMERCIAL

## 2025-06-11 ENCOUNTER — PATIENT MESSAGE (OUTPATIENT)
Dept: ORTHOPEDICS | Facility: CLINIC | Age: 60
End: 2025-06-11
Payer: COMMERCIAL

## 2025-07-01 ENCOUNTER — TELEPHONE (OUTPATIENT)
Dept: PHARMACY | Facility: CLINIC | Age: 60
End: 2025-07-01
Payer: COMMERCIAL

## 2025-07-02 NOTE — TELEPHONE ENCOUNTER
[facility]/Population Health Chart Review & Patient Outreach Details For Medication Adherence Project    Reason for Outreach Encounter: 3rd Party payor non-compliance report ([employer], [employer], [employer], etc)  2.  Patient Outreach Method: Reviewed patient chart   3.   Medication in question:    Hyperlipidemia Medications              atorvastatin (LIPITOR) 20 MG tablet Take 1 tablet by mouth once daily                  atorvastatin  last filled  4/17/25 for 90 day supply      4.  Reviewed and or Updates Made To: Patient Chart  5. Outreach Outcomes and/or actions taken: Patient filled medication and is on track to be adherent  Additional Notes:

## 2025-07-03 ENCOUNTER — OFFICE VISIT (OUTPATIENT)
Dept: ORTHOPEDICS | Facility: CLINIC | Age: 60
End: 2025-07-03
Payer: COMMERCIAL

## 2025-07-03 VITALS — BODY MASS INDEX: 34.56 KG/M2 | HEIGHT: 70 IN | WEIGHT: 241.38 LBS

## 2025-07-03 DIAGNOSIS — M75.41 IMPINGEMENT SYNDROME, SHOULDER, RIGHT: Primary | ICD-10-CM

## 2025-07-03 DIAGNOSIS — M75.21 BICEPS TENDINITIS OF RIGHT UPPER EXTREMITY: ICD-10-CM

## 2025-07-03 PROCEDURE — 20610 DRAIN/INJ JOINT/BURSA W/O US: CPT | Mod: RT,S$GLB,, | Performed by: ORTHOPAEDIC SURGERY

## 2025-07-03 PROCEDURE — 99499 UNLISTED E&M SERVICE: CPT | Mod: S$GLB,,, | Performed by: ORTHOPAEDIC SURGERY

## 2025-07-03 PROCEDURE — 99999 PR PBB SHADOW E&M-EST. PATIENT-LVL III: CPT | Mod: PBBFAC,,, | Performed by: ORTHOPAEDIC SURGERY

## 2025-07-03 RX ADMIN — TRIAMCINOLONE ACETONIDE 40 MG: 40 INJECTION, SUSPENSION INTRA-ARTICULAR; INTRAMUSCULAR at 03:07

## 2025-07-15 RX ORDER — TRIAMCINOLONE ACETONIDE 40 MG/ML
40 INJECTION, SUSPENSION INTRA-ARTICULAR; INTRAMUSCULAR
Status: DISCONTINUED | OUTPATIENT
Start: 2025-07-03 | End: 2025-07-15 | Stop reason: HOSPADM

## 2025-07-15 NOTE — PROGRESS NOTES
Chief Complaint   Patient presents with    Left Knee - Pain    Right Shoulder - Pain       HPI:    This is a 60 y.o. who presents today complaining of right shoulder pain for 2 weeks after no known trauma. Pain is dull. No numbness or tingling. No associated signs or symptoms.      Past Medical History:   Diagnosis Date    Allergy     Hyperlipemia     Hypertension       Past Surgical History:   Procedure Laterality Date    COLONOSCOPY  02/22/2019    Dr Lofton, received report     EPIDURAL STEROID INJECTION INTO LUMBAR SPINE N/A 9/28/2022    Procedure: Injection-steroid-epidural-lumbar L5/S1 to right;  Surgeon: Benny Pritchard MD;  Location: [facility] OR;  Service: Pain Management;  Laterality: N/A;    HERNIA REPAIR      REPAIR, RETINACULUM, KNEE Right 12/18/2023    Procedure: REPAIR, RETINACULUM, KNEE;  Surgeon: Humberto Mitchell MD;  Location: [facility] OR;  Service: Orthopedics;  Laterality: Right;    ROBOTIC ARTHROPLASTY, KNEE Right 7/24/2023    Procedure: ROBOTIC ARTHROPLASTY, KNEE, TOTAL - Kiet;  Surgeon: Humberto Mitchell MD;  Location: [facility] OR;  Service: Orthopedics;  Laterality: Right;      Medications Ordered Prior to Encounter[1]   Review of patient's allergies indicates:  No Known Allergies   Family History not pertinent   Social History[2]      Review of Systems:   Constitutional:  Denies fever or chills    Eyes:  Denies change in visual acuity    HENT:  Denies nasal congestion or sore throat    Respiratory:  Denies cough or shortness of breath    Cardiovascular:  Denies chest pain or edema    GI:  Denies abdominal pain, nausea, vomiting, bloody stools or diarrhea    :  Denies dysuria    Integument:  Denies rash    Neurologic:  Denies headache, focal weakness or sensory changes    Endocrine:  Denies polyuria or polydipsia    Lymphatic:  Denies swollen glands    Psychiatric:  Denies depression or anxiety       Physical Exam:    Constitutional:  Well developed, well nourished, no acute distress, non-toxic appearance     Integument:  Well hydrated, no rash    Lymphatic:  No lymphadenopathy noted    Neurologic:  Alert & oriented x 3,     Psychiatric:  Speech and behavior appropriate    Gi: abdomen soft  Eyes: EOMI     Bilateral Shoulder Exam    left Shoulder Exam   Shoulder exam performed same as contralateral side and is normal.    right Shoulder Exam   Tenderness   Shoulder tenderness location: diffusely about shoulder.    Range of Motion   Forward Flexion: abnormal   External Rotation: abnormal     Muscle Strength   Supraspinatus: 4/5     Tests   Hawkin's test: positive  Impingement: positive    Other   Erythema: absent  Sensation: normal  Pulse: present       Impingement syndrome, shoulder, right    Biceps tendinitis of right upper extremity          Using an aseptic technique, I injected 5 cc of lidocaine 1% without and 1 cc of kenalog 40mg into the right Shoulder and 1:1 in the right biceps. The patient tolerated this well. I will have them return to clinic in 6 weeks.              [1]   Current Outpatient Medications on File Prior to Visit   Medication Sig Dispense Refill    amLODIPine (NORVASC) 5 MG tablet Take 1 tablet by mouth once daily.      atorvastatin (LIPITOR) 20 MG tablet Take 1 tablet by mouth once daily 90 tablet 0    FLUoxetine 40 MG capsule Take 1 capsule by mouth once daily 90 capsule 0    metoprolol succinate (TOPROL-XL) 25 MG 24 hr tablet Take 1 tablet (25 mg total) by mouth once daily. 90 tablet 1    [DISCONTINUED] diclofenac sodium (VOLTAREN) 1 % Gel APPLY 4 GRAMS TOPICALLY FOUR TIMES A DAY AS NEEDED FOR PAIN AND INFLAMMATION. USE ENCLOSED DOSING CARD. BILATERAL KNEE PAIN       No current facility-administered medications on file prior to visit.   [2]   Social History  Socioeconomic History    Marital status:    Tobacco Use    Smoking status: Never    Smokeless tobacco: Never   Substance and Sexual Activity    Alcohol use: Yes     Alcohol/week: 1.0 standard drink of alcohol     Types: 1 Cans of  beer per week     Comment: occasional    Drug use: No    Sexual activity: Yes     Partners: Female     Social Drivers of Health     Financial Resource Strain: Low Risk  (2/5/2025)    Overall Financial Resource Strain (CARDIA)     Difficulty of Paying Living Expenses: Not hard at all   Food Insecurity: No Food Insecurity (2/5/2025)    Hunger Vital Sign     Worried About Running Out of Food in the Last Year: Never true     Ran Out of Food in the Last Year: Never true   Transportation Needs: No Transportation Needs (12/4/2023)    PRAPARE - Transportation     Lack of Transportation (Medical): No     Lack of Transportation (Non-Medical): No   Physical Activity: Insufficiently Active (2/5/2025)    Exercise Vital Sign     Days of Exercise per Week: 2 days     Minutes of Exercise per Session: 30 min   Stress: Stress Concern Present (2/5/2025)    Citizen of [place] of Occupational [employer] - Occupational Stress Questionnaire     Feeling of Stress : To some extent   Housing Stability: Low Risk  (12/4/2023)    Housing Stability Vital Sign     Unable to Pay for Housing in the Last Year: No     Number of Places Lived in the Last Year: 1     Unstable Housing in the Last Year: No

## 2025-07-15 NOTE — PROCEDURES
Large Joint Aspiration/Injection: R subacromial bursa    Date/Time: 7/3/2025 3:00 PM    Performed by: Humberto Mitchell MD  Authorized by: Humberto Mitchell MD    Consent Done?:  Yes (Verbal)  Indications:  Pain  Timeout: prior to procedure the correct patient, procedure, and site was verified    Prep: patient was prepped and draped in usual sterile fashion      Local anesthesia used?: Yes    Local anesthetic:  Lidocaine 1% without epinephrine  Anesthetic total (ml):  5      Details:  Needle Size:  22 G  Ultrasonic Guidance for needle placement?: No    Approach:  Posterior  Location:  Shoulder  Site:  R subacromial bursa  Medications:  40 mg triamcinolone acetonide 40 mg/mL  Patient tolerance:  Patient tolerated the procedure well with no immediate complications  Tendon Sheath    Date/Time: 7/3/2025 3:00 PM    Performed by: Humberto Mitchell MD  Authorized by: Humberto Mitchell MD    Consent Done?:  Yes (Verbal)  Indications:  Pain  Timeout: prior to procedure the correct patient, procedure, and site was verified    Local anesthesia used?: No    Location:  Shoulder  Site:  R bicep tendon  Ultrasonic guidance for needle placement?: No    Needle size:  25 G  Medications:  40 mg triamcinolone acetonide 40 mg/mL  Patient tolerance:  Patient tolerated the procedure well with no immediate complications

## 2025-08-14 ENCOUNTER — LAB VISIT (OUTPATIENT)
Dept: LAB | Facility: HOSPITAL | Age: 60
End: 2025-08-14
Attending: INTERNAL MEDICINE
Payer: COMMERCIAL

## 2025-08-14 ENCOUNTER — OFFICE VISIT (OUTPATIENT)
Dept: ORTHOPEDICS | Facility: CLINIC | Age: 60
End: 2025-08-14
Payer: COMMERCIAL

## 2025-08-14 VITALS — BODY MASS INDEX: 34.56 KG/M2 | HEIGHT: 70 IN | WEIGHT: 241.38 LBS

## 2025-08-14 DIAGNOSIS — M75.41 IMPINGEMENT SYNDROME, SHOULDER, RIGHT: ICD-10-CM

## 2025-08-14 DIAGNOSIS — M25.511 ACUTE PAIN OF RIGHT SHOULDER: Primary | ICD-10-CM

## 2025-08-14 DIAGNOSIS — E78.49 OTHER HYPERLIPIDEMIA: Chronic | ICD-10-CM

## 2025-08-14 DIAGNOSIS — I10 ESSENTIAL HYPERTENSION: Chronic | ICD-10-CM

## 2025-08-14 LAB
ALBUMIN SERPL BCP-MCNC: 4.3 G/DL (ref 3.5–5.2)
ALP SERPL-CCNC: 134 UNIT/L (ref 40–150)
ALT SERPL W/O P-5'-P-CCNC: 26 UNIT/L (ref 0–55)
ANION GAP (OHS): 13 MMOL/L (ref 8–16)
AST SERPL-CCNC: 26 UNIT/L (ref 0–50)
BILIRUB SERPL-MCNC: 0.9 MG/DL (ref 0.1–1)
BUN SERPL-MCNC: 24 MG/DL (ref 8–23)
CALCIUM SERPL-MCNC: 9.2 MG/DL (ref 8.7–10.5)
CHLORIDE SERPL-SCNC: 99 MMOL/L (ref 95–110)
CHOLEST SERPL-MCNC: 148 MG/DL (ref 120–199)
CHOLEST/HDLC SERPL: 3.4 {RATIO} (ref 2–5)
CO2 SERPL-SCNC: 25 MMOL/L (ref 23–29)
CREAT SERPL-MCNC: 1.1 MG/DL (ref 0.5–1.4)
GFR SERPLBLD CREATININE-BSD FMLA CKD-EPI: >60 ML/MIN/1.73/M2
GLUCOSE SERPL-MCNC: 80 MG/DL (ref 70–110)
HDLC SERPL-MCNC: 43 MG/DL (ref 40–75)
HDLC SERPL: 29.1 % (ref 20–50)
LDLC SERPL CALC-MCNC: 89.6 MG/DL (ref 63–159)
MAGNESIUM SERPL-MCNC: 2.2 MG/DL (ref 1.6–2.6)
NONHDLC SERPL-MCNC: 105 MG/DL
POTASSIUM SERPL-SCNC: 4.6 MMOL/L (ref 3.5–5.1)
PROT SERPL-MCNC: 7.5 GM/DL (ref 6–8.4)
SODIUM SERPL-SCNC: 137 MMOL/L (ref 136–145)
TRIGL SERPL-MCNC: 77 MG/DL (ref 30–150)

## 2025-08-14 PROCEDURE — 83735 ASSAY OF MAGNESIUM: CPT

## 2025-08-14 PROCEDURE — 1159F MED LIST DOCD IN RCRD: CPT | Mod: CPTII,S$GLB,, | Performed by: ORTHOPAEDIC SURGERY

## 2025-08-14 PROCEDURE — 99214 OFFICE O/P EST MOD 30 MIN: CPT | Mod: S$GLB,,, | Performed by: ORTHOPAEDIC SURGERY

## 2025-08-14 PROCEDURE — 80053 COMPREHEN METABOLIC PANEL: CPT

## 2025-08-14 PROCEDURE — 3008F BODY MASS INDEX DOCD: CPT | Mod: CPTII,S$GLB,, | Performed by: ORTHOPAEDIC SURGERY

## 2025-08-14 PROCEDURE — 36415 COLL VENOUS BLD VENIPUNCTURE: CPT | Mod: PO

## 2025-08-14 PROCEDURE — 80061 LIPID PANEL: CPT

## 2025-08-14 PROCEDURE — 1160F RVW MEDS BY RX/DR IN RCRD: CPT | Mod: CPTII,S$GLB,, | Performed by: ORTHOPAEDIC SURGERY

## 2025-08-14 PROCEDURE — 99999 PR PBB SHADOW E&M-EST. PATIENT-LVL III: CPT | Mod: PBBFAC,,, | Performed by: ORTHOPAEDIC SURGERY

## 2025-08-21 ENCOUNTER — OFFICE VISIT (OUTPATIENT)
Dept: CARDIOLOGY | Facility: CLINIC | Age: 60
End: 2025-08-21
Payer: COMMERCIAL

## 2025-08-21 VITALS
HEIGHT: 70 IN | HEART RATE: 66 BPM | SYSTOLIC BLOOD PRESSURE: 100 MMHG | DIASTOLIC BLOOD PRESSURE: 66 MMHG | BODY MASS INDEX: 29.7 KG/M2 | WEIGHT: 207.44 LBS

## 2025-08-21 DIAGNOSIS — I34.0 NONRHEUMATIC MITRAL VALVE REGURGITATION: Primary | Chronic | ICD-10-CM

## 2025-08-21 DIAGNOSIS — N52.8 OTHER MALE ERECTILE DYSFUNCTION: ICD-10-CM

## 2025-08-21 DIAGNOSIS — I49.49 PREMATURE BEATS: Chronic | ICD-10-CM

## 2025-08-21 DIAGNOSIS — I77.9 MILD CAROTID ARTERY DISEASE: ICD-10-CM

## 2025-08-21 DIAGNOSIS — E66.3 OVERWEIGHT (BMI 25.0-29.9): Chronic | ICD-10-CM

## 2025-08-21 DIAGNOSIS — I10 ESSENTIAL HYPERTENSION: Chronic | ICD-10-CM

## 2025-08-21 DIAGNOSIS — E78.2 MIXED HYPERLIPIDEMIA: Chronic | ICD-10-CM

## 2025-08-21 DIAGNOSIS — I77.810 ASCENDING AORTA DILATATION: Chronic | ICD-10-CM

## 2025-08-21 PROCEDURE — 99214 OFFICE O/P EST MOD 30 MIN: CPT | Mod: S$GLB,,, | Performed by: INTERNAL MEDICINE

## 2025-08-21 PROCEDURE — 3078F DIAST BP <80 MM HG: CPT | Mod: CPTII,S$GLB,, | Performed by: INTERNAL MEDICINE

## 2025-08-21 PROCEDURE — 3074F SYST BP LT 130 MM HG: CPT | Mod: CPTII,S$GLB,, | Performed by: INTERNAL MEDICINE

## 2025-08-21 PROCEDURE — 1159F MED LIST DOCD IN RCRD: CPT | Mod: CPTII,S$GLB,, | Performed by: INTERNAL MEDICINE

## 2025-08-21 PROCEDURE — 99999 PR PBB SHADOW E&M-EST. PATIENT-LVL III: CPT | Mod: PBBFAC,,, | Performed by: INTERNAL MEDICINE

## 2025-08-21 PROCEDURE — 3008F BODY MASS INDEX DOCD: CPT | Mod: CPTII,S$GLB,, | Performed by: INTERNAL MEDICINE

## 2025-08-21 RX ORDER — ATORVASTATIN CALCIUM 20 MG/1
20 TABLET, FILM COATED ORAL NIGHTLY
Qty: 90 TABLET | Refills: 2 | Status: SHIPPED | OUTPATIENT
Start: 2025-08-21

## 2025-08-21 RX ORDER — AMLODIPINE BESYLATE 5 MG/1
5 TABLET ORAL DAILY
Qty: 90 TABLET | Refills: 2 | Status: SHIPPED | OUTPATIENT
Start: 2025-08-21

## 2025-08-21 RX ORDER — SILDENAFIL 100 MG/1
100 TABLET, FILM COATED ORAL DAILY PRN
Qty: 6 TABLET | Refills: 1 | Status: SHIPPED | OUTPATIENT
Start: 2025-08-21 | End: 2026-08-21

## 2025-08-23 ENCOUNTER — HOSPITAL ENCOUNTER (OUTPATIENT)
Dept: RADIOLOGY | Facility: HOSPITAL | Age: 60
Discharge: HOME | End: 2025-08-23
Attending: ORTHOPAEDIC SURGERY
Payer: COMMERCIAL

## 2025-08-23 DIAGNOSIS — M25.511 ACUTE PAIN OF RIGHT SHOULDER: ICD-10-CM

## 2025-08-23 PROBLEM — I77.9 MILD CAROTID ARTERY DISEASE: Status: ACTIVE | Noted: 2025-08-23

## 2025-08-23 PROCEDURE — 73221 MRI JOINT UPR EXTREM W/O DYE: CPT | Mod: 26,RT,, | Performed by: RADIOLOGY

## 2025-08-23 PROCEDURE — 73221 MRI JOINT UPR EXTREM W/O DYE: CPT | Mod: TC,PO,RT

## (undated) DEVICE — TRAY NERVE BLOCK

## (undated) DEVICE — APPLICATOR CHLORAPREP CLR 10.5

## (undated) DEVICE — GLOVE 7.5 PROTEXIS PI MICRO

## (undated) DEVICE — SOL SOD CHLORIDE 0.9% 10ML